# Patient Record
Sex: MALE | Race: BLACK OR AFRICAN AMERICAN | NOT HISPANIC OR LATINO | Employment: OTHER | URBAN - METROPOLITAN AREA
[De-identification: names, ages, dates, MRNs, and addresses within clinical notes are randomized per-mention and may not be internally consistent; named-entity substitution may affect disease eponyms.]

---

## 2017-07-25 ENCOUNTER — TELEPHONE (OUTPATIENT)
Dept: NEUROLOGY | Facility: CLINIC | Age: 72
End: 2017-07-25

## 2017-07-25 NOTE — TELEPHONE ENCOUNTER
Received referral from Dr. Jolley for patient to see Dr. Cardenas or Dr. Machado for epilepsy.     Tried to call patient. No answer. Referral in [facility] and sent to scanning.

## 2017-08-29 ENCOUNTER — TELEPHONE (OUTPATIENT)
Dept: NEUROLOGY | Facility: CLINIC | Age: 72
End: 2017-08-29

## 2017-08-29 NOTE — TELEPHONE ENCOUNTER
Spoke with pt to schedule an appointment with Dr. Machado. He advised that his wife needed to call back to schedule his appointment because he was not sure what day she could bring him

## 2017-09-25 ENCOUNTER — OFFICE VISIT (OUTPATIENT)
Dept: NEUROLOGY | Facility: CLINIC | Age: 72
End: 2017-09-25
Payer: MEDICARE

## 2017-09-25 ENCOUNTER — HOSPITAL ENCOUNTER (OUTPATIENT)
Dept: RADIOLOGY | Facility: HOSPITAL | Age: 72
Discharge: HOME | End: 2017-09-25
Attending: PSYCHIATRY & NEUROLOGY
Payer: MEDICARE

## 2017-09-25 VITALS
SYSTOLIC BLOOD PRESSURE: 147 MMHG | WEIGHT: 165 LBS | RESPIRATION RATE: 20 BRPM | DIASTOLIC BLOOD PRESSURE: 79 MMHG | HEART RATE: 54 BPM | BODY MASS INDEX: 24.44 KG/M2 | HEIGHT: 69 IN

## 2017-09-25 DIAGNOSIS — M85.9 DISORDER OF BONE DENSITY AND STRUCTURE, UNSPECIFIED: ICD-10-CM

## 2017-09-25 DIAGNOSIS — G40.009 PARTIAL IDIOPATHIC EPILEPSY WITH SEIZURES OF LOCALIZED ONSET, NOT INTRACTABLE, WITHOUT STATUS EPILEPTICUS: ICD-10-CM

## 2017-09-25 DIAGNOSIS — G40.009 PARTIAL IDIOPATHIC EPILEPSY WITH SEIZURES OF LOCALIZED ONSET, NOT INTRACTABLE, WITHOUT STATUS EPILEPTICUS: Primary | ICD-10-CM

## 2017-09-25 PROCEDURE — 77081 DXA BONE DENSITY APPENDICULR: CPT | Mod: TC,PO

## 2017-09-25 PROCEDURE — 3008F BODY MASS INDEX DOCD: CPT | Mod: S$GLB,,, | Performed by: PSYCHIATRY & NEUROLOGY

## 2017-09-25 PROCEDURE — 99204 OFFICE O/P NEW MOD 45 MIN: CPT | Mod: S$GLB,,, | Performed by: PSYCHIATRY & NEUROLOGY

## 2017-09-25 PROCEDURE — 77081 DXA BONE DENSITY APPENDICULR: CPT | Mod: 26,,, | Performed by: RADIOLOGY

## 2017-09-25 PROCEDURE — 99999 PR PBB SHADOW E&M-EST. PATIENT-LVL III: CPT | Mod: PBBFAC,,, | Performed by: PSYCHIATRY & NEUROLOGY

## 2017-09-25 PROCEDURE — 1159F MED LIST DOCD IN RCRD: CPT | Mod: S$GLB,,, | Performed by: PSYCHIATRY & NEUROLOGY

## 2017-09-25 PROCEDURE — 1126F AMNT PAIN NOTED NONE PRSNT: CPT | Mod: S$GLB,,, | Performed by: PSYCHIATRY & NEUROLOGY

## 2017-09-25 RX ORDER — CAPTOPRIL AND HYDROCHLOROTHIAZIDE 25; 15 MG/1; MG/1
TABLET ORAL
COMMUNITY
Start: 2017-07-03

## 2017-09-25 RX ORDER — LACOSAMIDE 200 MG/1
200 TABLET, FILM COATED ORAL 2 TIMES DAILY
COMMUNITY
Start: 2017-07-05 | End: 2018-03-14 | Stop reason: ALTCHOICE

## 2017-09-25 RX ORDER — CITALOPRAM 20 MG/1
20 TABLET, FILM COATED ORAL
COMMUNITY
Start: 2017-07-01 | End: 2022-10-18

## 2017-09-25 RX ORDER — PHENYTOIN SODIUM 100 MG/1
100 CAPSULE, EXTENDED RELEASE ORAL NIGHTLY
COMMUNITY
Start: 2017-08-25 | End: 2017-10-11 | Stop reason: SDUPTHER

## 2017-09-25 RX ORDER — PHENYTOIN 50 MG/1
50 TABLET, CHEWABLE ORAL NIGHTLY
COMMUNITY
Start: 2017-07-03 | End: 2017-10-11 | Stop reason: DRUGHIGH

## 2017-09-25 RX ORDER — DIAZEPAM 5 MG/1
5 TABLET ORAL DAILY
COMMUNITY
End: 2017-10-16 | Stop reason: SDUPTHER
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
## 2017-09-25 NOTE — LETTER
September 25, 2017      Nevin Jolley MD  [address]  [place] [address]           [facility]  [address] [facility] [address] [place] [address]  Phone: [phone]  Fax: [phone]          Patient: Kallie Ball   MR Number: [id number]   YOB: 1945   Date of Visit: 9/25/2017       Dear Dr. Nevin Jolley:    Thank you for referring Kallie Ball to me for evaluation. Attached you will find relevant portions of my assessment and plan of care.    If you have questions, please do not hesitate to call me. I look forward to following Kallie Ball along with you.    Sincerely,    Odilia Machado MD    Enclosure  CC:  No Recipients    If you would like to receive this communication electronically, please contact [email or web address] or [phone] to request more information on [facility] access.    For providers and/or their staff who would like to refer a patient to [facility], please contact us through our one-stop-shop provider referral line, [facility], at [phone].    If you feel you have received this communication in error or would no longer like to receive these types of communications, please e-mail [email or web address]

## 2017-09-25 NOTE — PATIENT INSTRUCTIONS
- Bone density scan  - Get records from past medications, lab tests, EEGs, MRI brain, and CT head.

## 2017-09-27 ENCOUNTER — TELEPHONE (OUTPATIENT)
Dept: NEUROLOGY | Facility: CLINIC | Age: 72
End: 2017-09-27

## 2017-09-27 NOTE — TELEPHONE ENCOUNTER
Spoke with patient and patient's wife. Was trying to schedule orders for MRI, Creatinine level and EEG. States she will call patient's PCP today to see if he can order those for them where they live and get results sent to our office. Verified that Dr. Machado said this was fine at their visit. Will wait for their call back.

## 2017-10-11 ENCOUNTER — TELEPHONE (OUTPATIENT)
Dept: NEUROLOGY | Facility: CLINIC | Age: 72
End: 2017-10-11

## 2017-10-11 DIAGNOSIS — G40.009 PARTIAL IDIOPATHIC EPILEPSY WITH SEIZURES OF LOCALIZED ONSET, NOT INTRACTABLE, WITHOUT STATUS EPILEPTICUS: Primary | ICD-10-CM

## 2017-10-11 RX ORDER — PHENYTOIN SODIUM 100 MG/1
200 CAPSULE, EXTENDED RELEASE ORAL NIGHTLY
Qty: 60 CAPSULE | Refills: 6 | Status: SHIPPED | OUTPATIENT
Start: 2017-10-11 | End: 2017-10-11 | Stop reason: SDUPTHER

## 2017-10-11 RX ORDER — PHENYTOIN SODIUM 100 MG/1
300 CAPSULE, EXTENDED RELEASE ORAL NIGHTLY
Qty: 90 CAPSULE | Refills: 6 | Status: SHIPPED | OUTPATIENT
Start: 2017-10-11 | End: 2022-08-02 | Stop reason: SDUPTHER

## 2017-10-11 NOTE — TELEPHONE ENCOUNTER
Spoke with patient's wife, Erica. Stated that patient is already taking 250 mg of Dilantin nightly; did you want him to get rid of the 50 mg tablet?

## 2017-10-11 NOTE — TELEPHONE ENCOUNTER
Spoke with patient's wife, Erica. Informed her of medication increase and that rx was at pharmacy for updated instructions. Verified where patient wanted orders sent to; faxed orders to Dr. Quiles.

## 2017-10-11 NOTE — PROGRESS NOTES
The patient had a breakthrough seizure. We will increase his phenytoin to 200 mg nightly. I have sent this to his pharmacy on file, [facility]. Vimpat should remain the same at 200 mg twice daily.     We will recheck a dilantin blood level in 1-2 weeks after dose change to obtain his new level baseline in the event more changes need to be made. This will be printed as an external lab order and can be faxed to his lab of choice.

## 2017-10-11 NOTE — TELEPHONE ENCOUNTER
----- Message from Darrell Funes sent at 10/11/2017 10:24 AM CDT -----  Contact: Wife/Erica Maldonado called in regarding the attached patient () and stated patient had a seizure yesterday 10/10 and wanted to talk to nurse about it.  Erica's call back number is [phone]
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
## 2017-10-11 NOTE — TELEPHONE ENCOUNTER
----- Message from Clementine Machado sent at 10/11/2017  1:28 PM CDT -----  Was advised to take 200 mgs of Dylantin ...That is the dosage that he is taking now/ call Erica  ..[phone] to advise

## 2017-10-11 NOTE — TELEPHONE ENCOUNTER
We will increase the Dilantin to 200 mg nightly. I will send through a prescription for this to his pharmacy on file. We should check a trough level 1-2 weeks after he makes that dose change. That could be done closer to home and I will write for a outside lab order that can be sent to his lab of choice.

## 2017-10-11 NOTE — TELEPHONE ENCOUNTER
Spoke with patient's wife, Erica. She stated that he was doing 250 mg a night; states they will increase it to 300 mg. Please send that to the pharmacy.

## 2017-10-11 NOTE — TELEPHONE ENCOUNTER
We had in our system that he was only taking 150 mg nightly of the dilantin. If she is sure he is taking 250 mg nightly, then I would have him increase to 300 mg nightly. Let me know and I can resend the prescription.

## 2017-10-16 RX ORDER — DIAZEPAM 5 MG/1
5 TABLET ORAL DAILY
Qty: 90 TABLET | Refills: 3 | Status: SHIPPED | OUTPATIENT
Start: 2017-10-16 | End: 2018-10-16

## 2017-10-16 NOTE — TELEPHONE ENCOUNTER
Spoke with Emelyn at [facility] pharmacy. Verified that Dilantin prescription was for 300 mg and not 200 mg anymore. She stated that they needed the prescription for the Valium; the system shows take 5 mg once a day, but it doesn't have a dispense number or refill number. Message routed to Dr. Machado.

## 2018-02-14 ENCOUNTER — TELEPHONE (OUTPATIENT)
Dept: NEUROLOGY | Facility: CLINIC | Age: 73
End: 2018-02-14

## 2018-02-14 NOTE — TELEPHONE ENCOUNTER
----- Message from Irlanda Ingram sent at 2/12/2018  2:44 PM CST -----  Contact: wife, Erica  Patient's wife says that the seizure medications are too expensive. She's requesting a cheaper substitute if possible. Please call back [phone]

## 2018-02-19 ENCOUNTER — TELEPHONE (OUTPATIENT)
Dept: NEUROLOGY | Facility: CLINIC | Age: 73
End: 2018-02-19

## 2018-02-19 RX ORDER — ZONISAMIDE 100 MG/1
300 CAPSULE ORAL DAILY
Qty: 90 CAPSULE | Refills: 3 | Status: SHIPPED | OUTPATIENT
Start: 2018-02-19 | End: 2019-02-19

## 2018-02-19 NOTE — TELEPHONE ENCOUNTER
Patient had two grand mal seizures and was taken to [facility]. He has not had his Vimpat in over 1 week, because he cannot afford the medicine. Patient instructed to call [employer] and let them know that he cannot afford the Vimpat.  Also given Lexus Nicole's number for assistance.  Please advise.

## 2018-02-19 NOTE — TELEPHONE ENCOUNTER
----- Message from Ciara Innershyanne sent at 2/19/2018 11:20 AM CST -----  Contact: Wife Erica  Patient has two bad seizures on Friday 2/16 and she had to rush him to [facility]. Set appt on 3/14 but needs to be seen now.  He is not on some of his anti seizure meds because his assistance ran out and needs help with the cost.   She is requesting a call back, to set an earlier appt call back at [phone] (home).

## 2018-02-19 NOTE — TELEPHONE ENCOUNTER
Spoke with patient's wife. Offered for her to come to the clinic to get savings card for Vimpat tomorrow; requesting a new prescription be called in all together.

## 2018-03-14 ENCOUNTER — OFFICE VISIT (OUTPATIENT)
Dept: NEUROLOGY | Facility: CLINIC | Age: 73
End: 2018-03-14
Payer: MEDICARE

## 2018-03-14 VITALS
HEART RATE: 57 BPM | BODY MASS INDEX: 24.09 KG/M2 | WEIGHT: 162.69 LBS | HEIGHT: 69 IN | RESPIRATION RATE: 20 BRPM | DIASTOLIC BLOOD PRESSURE: 72 MMHG | SYSTOLIC BLOOD PRESSURE: 169 MMHG

## 2018-03-14 DIAGNOSIS — R56.9 SEIZURES: Primary | ICD-10-CM

## 2018-03-14 DIAGNOSIS — F32.A DEPRESSION, UNSPECIFIED DEPRESSION TYPE: ICD-10-CM

## 2018-03-14 DIAGNOSIS — R45.4 IRRITABILITY: ICD-10-CM

## 2018-03-14 DIAGNOSIS — Z79.899 HIGH RISK MEDICATION USE: ICD-10-CM

## 2018-03-14 DIAGNOSIS — K59.00 CONSTIPATION, UNSPECIFIED CONSTIPATION TYPE: ICD-10-CM

## 2018-03-14 PROCEDURE — 99999 PR PBB SHADOW E&M-EST. PATIENT-LVL III: CPT | Mod: PBBFAC,,, | Performed by: PSYCHIATRY & NEUROLOGY

## 2018-03-14 PROCEDURE — 99215 OFFICE O/P EST HI 40 MIN: CPT | Mod: S$GLB,,, | Performed by: PSYCHIATRY & NEUROLOGY

## 2018-03-14 RX ORDER — PHENYTOIN 50 MG/1
50 TABLET, CHEWABLE ORAL
COMMUNITY
Start: 2018-03-13 | End: 2022-08-02 | Stop reason: SDUPTHER

## 2018-03-14 NOTE — PROGRESS NOTES
Date: 3/14/2018    Patient ID: Kallie Ball is a 72 y.o. male.    Chief Complaint: Seizures      History of Present Illness:  Mr. Ball is a 72 y.o. male who presents for follow up of seizures. The patient was accompanied by his wife who also contributed to the following history. I also reviewed his records from his ER visit in Feb 2018 and summarized below.     Since our last visit, the patient ran out of Vimpat and had two breakthrough seizures due to missing the Vimpat. These occurred on Friday February 16 at 11am and 8pm. He went to the ER and was given Vimpat 100 mg IV and then sent home. He was not able to get the Vimpat and could not afford to pick it up ($800 for the month). He has tried Keppra and trileptal in the past. He has no history of kidney stones and we added zonisamide since we can uptitrate quickly.     His last presumed seizure before these breakthrough seizures was in July 2017. His breakthrough seizures were on Feb 16, 2018. He is not driving anymore. He is very frustrated by the inability to drive and that loss of his independence. His wife notes that he is irritable. He also has constipation and has been taking mineral oil.     He is now taking the Dilantin 50 mg in the morning and 200 mg nightly. He is on the zonisamide 300 mg qHS (since last Sunday). He has had no further seizures since the breakthrough seizures. He is dizzy some mornings. No other side effects. He is on valium 5 mg daily.     Review of Systems:   Comprehensive review of systems is negative except as mentioned in the HPI    Allergies:  Review of patient's allergies indicates:   Allergen Reactions    Benadryl [diphenhydramine hcl] Other (See Comments)     Lowers seizure threshold.    Quinolones Other (See Comments)     Lowers seizure threshold.      Tramadol Other (See Comments)     Lowers seizure threshold.         Current Medications:  Current Outpatient Prescriptions   Medication Sig Dispense Refill     "captopril-hydrochlorothiazide (CAPOZIDE) 25-15 mg Tab       citalopram (CELEXA) 20 MG tablet Take 20 mg by mouth.      diazePAM (VALIUM) 5 MG tablet Take 1 tablet (5 mg total) by mouth once daily. 90 tablet 3    multivitamin with minerals tablet       phenytoin (DILANTIN) 100 MG ER capsule Take 3 capsules (300 mg total) by mouth every evening. (Patient taking differently: Take 200 mg by mouth every evening. ) 90 capsule 6    phenytoin (DILANTIN) 50 mg chewable tablet Take 50 mg by mouth.       zonisamide (ZONEGRAN) 100 MG Cap Take 3 capsules (300 mg total) by mouth once daily. 90 capsule 3     No current facility-administered medications for this visit.        Past Medical History:  Past Medical History:   Diagnosis Date    Epilepsy        Past Surgical History:  History reviewed. No pertinent surgical history.    Family History:  family history includes Stroke in his mother.    Social History:   reports that he has never smoked. He has never used smokeless tobacco. He reports that he does not drink alcohol or use drugs.    Physical Exam:  Vitals:    03/14/18 1524   BP: (!) 169/72   Pulse: (!) 57   Resp: 20   Weight: 73.8 kg (162 lb 11.2 oz)   Height: 5' 9" (1.753 m)   PainSc: 0-No pain     Body mass index is 24.03 kg/m².  General: Well developed, well nourished.  No acute distress.  Skin: no rashes  Mental status: Awake and alert  Speech language: No dysarthria or aphasia on conversation  Cranial nerves: Face symmetric  Motor: Moves all extremities well  Coordination: No ataxia. FNF normal. No tremor.   Gait: normal including tandem walking    Data:  I have personally reviewed the referring provider's notes, labs, & imaging made available to me today. I reviewed outside records from his ER evaluation in February 2018.     Labs:  CBC from 2/16/18: WBC 4.3, Hg 13.1, Plt 157  CMP normal electrolytes    Imaging:  No MRI obtained.   Bone density obtained in September 2017 was normal.     Assessment and Plan:  " Lizzy is a 72 y.o. male who presents for follow up of seizures. They have no obtained the EEG or MRI as ordered at previous appointment.     He has thankfully not had any further seizures. We discussed that he must be seizure-free for 6 months before driving. He should not drive until August 2018 if he stays seizure-free. I encouraged compliance with medications, lowering stress. He is irritable and has constipation. We discussed how depression commonly is associated with epilepsy and this can present as irritability. I encouraged him to discuss these with his PCP for treatment. They asked for a prescription for Linzess and increase in his valium. I discussed how increasing the valium is not a good option for that. The medication for constipation will have to be prescribed by his PCP.     We will obtain drug levels and panels for high risk drug monitoring. His DEXA will need to be repeated yearly. We will continue his current doses of zonisamide and phenytoin and make decisions about adjustment after levels obtained.     Seizures with recent breakthrough seizure activity  -     Phenytoin level, total and free; Future; Expected date: 03/15/2018  -     Zonisamide level; Future; Expected date: 03/14/2018  -     Comprehensive metabolic panel; Future; Expected date: 03/14/2018  -     CBC auto differential; Future; Expected date: 03/14/2018    Constipation, unspecified constipation type    Irritability    High risk medication use    Depression

## 2022-07-04 NOTE — PROGRESS NOTES
The patient ran out of Vimpat and had two breakthrough seizures due to missing the Vimpat. These occurred on Friday at 11am and 8pm. He went to the ER and was given Vimpat 100 mg IV and then sent home. They have not been able to get the Vimpat and cannot afford to pick it up ($800 for the month). He has tried Keppra and trileptal in the past. He has no history of kidney stones and we will try adding zonisamide since we can uptitrate quickly. I will call in a prescription for zonisamide 100 mg daily for 1 week, then can increase by 100 mg weekly for a max dose of 300 mg daily. I will see them back in follow up soon but encouraged her to call with questions or concerns or if he has more seizures in the meantime.   
negative...

## 2022-08-02 ENCOUNTER — OFFICE VISIT (OUTPATIENT)
Dept: FAMILY MEDICINE | Facility: CLINIC | Age: 77
End: 2022-08-02
Payer: MEDICARE

## 2022-08-02 VITALS
OXYGEN SATURATION: 98 % | BODY MASS INDEX: 23.05 KG/M2 | SYSTOLIC BLOOD PRESSURE: 130 MMHG | TEMPERATURE: 98 F | HEART RATE: 65 BPM | DIASTOLIC BLOOD PRESSURE: 70 MMHG | HEIGHT: 69 IN | WEIGHT: 155.63 LBS

## 2022-08-02 DIAGNOSIS — R31.9 URINARY TRACT INFECTION WITH HEMATURIA, SITE UNSPECIFIED: ICD-10-CM

## 2022-08-02 DIAGNOSIS — Z13.220 ENCOUNTER FOR LIPID SCREENING FOR CARDIOVASCULAR DISEASE: Primary | ICD-10-CM

## 2022-08-02 DIAGNOSIS — I10 HYPERTENSION, UNSPECIFIED TYPE: ICD-10-CM

## 2022-08-02 DIAGNOSIS — N39.0 URINARY TRACT INFECTION WITH HEMATURIA, SITE UNSPECIFIED: ICD-10-CM

## 2022-08-02 DIAGNOSIS — G40.009 PARTIAL IDIOPATHIC EPILEPSY WITH SEIZURES OF LOCALIZED ONSET, NOT INTRACTABLE, WITHOUT STATUS EPILEPTICUS: ICD-10-CM

## 2022-08-02 DIAGNOSIS — Z13.6 ENCOUNTER FOR LIPID SCREENING FOR CARDIOVASCULAR DISEASE: Primary | ICD-10-CM

## 2022-08-02 PROCEDURE — 1101F PT FALLS ASSESS-DOCD LE1/YR: CPT | Mod: CPTII,S$GLB,, | Performed by: NURSE PRACTITIONER

## 2022-08-02 PROCEDURE — 99999 PR PBB SHADOW E&M-NEW PATIENT-LVL V: CPT | Mod: PBBFAC,,, | Performed by: NURSE PRACTITIONER

## 2022-08-02 PROCEDURE — 1159F MED LIST DOCD IN RCRD: CPT | Mod: CPTII,S$GLB,, | Performed by: NURSE PRACTITIONER

## 2022-08-02 PROCEDURE — 99214 PR OFFICE/OUTPT VISIT, EST, LEVL IV, 30-39 MIN: ICD-10-PCS | Mod: S$GLB,,, | Performed by: NURSE PRACTITIONER

## 2022-08-02 PROCEDURE — 99214 OFFICE O/P EST MOD 30 MIN: CPT | Mod: S$GLB,,, | Performed by: NURSE PRACTITIONER

## 2022-08-02 PROCEDURE — 1160F PR REVIEW ALL MEDS BY PRESCRIBER/CLIN PHARMACIST DOCUMENTED: ICD-10-PCS | Mod: CPTII,S$GLB,, | Performed by: NURSE PRACTITIONER

## 2022-08-02 PROCEDURE — 3078F DIAST BP <80 MM HG: CPT | Mod: CPTII,S$GLB,, | Performed by: NURSE PRACTITIONER

## 2022-08-02 PROCEDURE — 3288F PR FALLS RISK ASSESSMENT DOCUMENTED: ICD-10-PCS | Mod: CPTII,S$GLB,, | Performed by: NURSE PRACTITIONER

## 2022-08-02 PROCEDURE — 3075F PR MOST RECENT SYSTOLIC BLOOD PRESS GE 130-139MM HG: ICD-10-PCS | Mod: CPTII,S$GLB,, | Performed by: NURSE PRACTITIONER

## 2022-08-02 PROCEDURE — 3288F FALL RISK ASSESSMENT DOCD: CPT | Mod: CPTII,S$GLB,, | Performed by: NURSE PRACTITIONER

## 2022-08-02 PROCEDURE — 1101F PR PT FALLS ASSESS DOC 0-1 FALLS W/OUT INJ PAST YR: ICD-10-PCS | Mod: CPTII,S$GLB,, | Performed by: NURSE PRACTITIONER

## 2022-08-02 PROCEDURE — 99999 PR PBB SHADOW E&M-NEW PATIENT-LVL V: ICD-10-PCS | Mod: PBBFAC,,, | Performed by: NURSE PRACTITIONER

## 2022-08-02 PROCEDURE — 3075F SYST BP GE 130 - 139MM HG: CPT | Mod: CPTII,S$GLB,, | Performed by: NURSE PRACTITIONER

## 2022-08-02 PROCEDURE — 1126F AMNT PAIN NOTED NONE PRSNT: CPT | Mod: CPTII,S$GLB,, | Performed by: NURSE PRACTITIONER

## 2022-08-02 PROCEDURE — 3078F PR MOST RECENT DIASTOLIC BLOOD PRESSURE < 80 MM HG: ICD-10-PCS | Mod: CPTII,S$GLB,, | Performed by: NURSE PRACTITIONER

## 2022-08-02 PROCEDURE — 1159F PR MEDICATION LIST DOCUMENTED IN MEDICAL RECORD: ICD-10-PCS | Mod: CPTII,S$GLB,, | Performed by: NURSE PRACTITIONER

## 2022-08-02 PROCEDURE — 1160F RVW MEDS BY RX/DR IN RCRD: CPT | Mod: CPTII,S$GLB,, | Performed by: NURSE PRACTITIONER

## 2022-08-02 PROCEDURE — 1126F PR PAIN SEVERITY QUANTIFIED, NO PAIN PRESENT: ICD-10-PCS | Mod: CPTII,S$GLB,, | Performed by: NURSE PRACTITIONER

## 2022-08-02 RX ORDER — AMOXICILLIN AND CLAVULANATE POTASSIUM 875; 125 MG/1; MG/1
1 TABLET, FILM COATED ORAL 2 TIMES DAILY
Qty: 20 TABLET | Refills: 0 | Status: SHIPPED | OUTPATIENT
Start: 2022-08-02 | End: 2022-08-12

## 2022-08-02 RX ORDER — BUPROPION HYDROCHLORIDE 150 MG/1
150 TABLET ORAL DAILY
Status: CANCELLED | OUTPATIENT
Start: 2022-08-02

## 2022-08-02 RX ORDER — LAMOTRIGINE 200 MG/1
200 TABLET ORAL DAILY
COMMUNITY

## 2022-08-02 RX ORDER — CAPTOPRIL 25 MG/1
25 TABLET ORAL 3 TIMES DAILY
COMMUNITY
End: 2022-08-02 | Stop reason: SDUPTHER

## 2022-08-02 RX ORDER — SIMVASTATIN 5 MG/1
5 TABLET, FILM COATED ORAL NIGHTLY
COMMUNITY

## 2022-08-02 RX ORDER — PHENYTOIN 50 MG/1
50 TABLET, CHEWABLE ORAL ONCE
Qty: 90 TABLET | Refills: 3 | Status: SHIPPED | OUTPATIENT
Start: 2022-08-02 | End: 2023-09-27

## 2022-08-02 RX ORDER — CAPTOPRIL 25 MG/1
25 TABLET ORAL 3 TIMES DAILY
Qty: 270 TABLET | Refills: 3 | Status: SHIPPED | OUTPATIENT
Start: 2022-08-02 | End: 2022-10-31

## 2022-08-02 RX ORDER — BUPROPION HYDROCHLORIDE 150 MG/1
150 TABLET ORAL DAILY
COMMUNITY

## 2022-08-02 RX ORDER — DIAZEPAM 5 MG/1
5 TABLET ORAL DAILY
Qty: 90 TABLET | Refills: 3 | Status: CANCELLED | OUTPATIENT
Start: 2022-08-02 | End: 2023-08-02

## 2022-08-02 RX ORDER — PHENYTOIN SODIUM 100 MG/1
300 CAPSULE, EXTENDED RELEASE ORAL NIGHTLY
Qty: 270 CAPSULE | Refills: 3 | Status: SHIPPED | OUTPATIENT
Start: 2022-08-02 | End: 2022-11-18 | Stop reason: SDUPTHER

## 2022-08-02 RX ORDER — OMEPRAZOLE MAGNESIUM 10 MG/1
GRANULE, DELAYED RELEASE ORAL
COMMUNITY

## 2022-08-02 NOTE — PATIENT INSTRUCTIONS
Ravindra Arreguin,     If you are due for any health screening(s) below please notify me so we can arrange them to be ordered and scheduled to maintain your health. Most healthy patients complete it. Don't lose out on improving your health.     Tests to Keep You Healthy             - continue all seizure medications and blood pressure medication      - check lipid and dilantin level      - referral to neurology ASAP     - appt with new PCP     - Do not drive until cleared by neurology

## 2022-08-02 NOTE — PROGRESS NOTES
Subjective:       Patient ID: Kallie Ball is a 77 y.o. male.    Chief Complaint: Seizures    77-year-old male presents to the clinic today with his daughter requesting refills on medications.  She does not feel like he still needs a Valium Wellbutrin.  I explained to her that Wellbutrin was contraindicated when he had a seizure disorder.  His last seizure was Friday night.  I stressed the importance that he should not be driving until cleared by Neurology.  He was seen in the emergency room in [facility] on 07/27/2022 for seizures and cystitis.  He was treated with Bactrim but the daughter states the Bactrim is causing seizures.  I will stop the Bactrim and start Augmentin.  I will try to get him into Neurology as soon as possible.  He denies any cardiac chest pain, heart palpitations, shortness breath, or swelling to lower extremities.  He denies any coughing, wheezing, or shortness of breath.  He denies any headaches, dizziness, or blurred vision.    Past Medical History:   Diagnosis Date    Epilepsy      History reviewed. No pertinent surgical history.   reports that he has never smoked. He has never used smokeless tobacco. He reports that he does not drink alcohol and does not use drugs.  Review of Systems   Cardiovascular: Negative for chest pain, palpitations and leg swelling.   Gastrointestinal: Negative for abdominal pain, diarrhea and vomiting.   Musculoskeletal: Negative for gait problem.   Neurological: Positive for seizures. Negative for dizziness, light-headedness and headaches.       Objective:      Physical Exam  Vitals and nursing note reviewed.   Constitutional:       General: He is not in acute distress.     Appearance: Normal appearance. He is not ill-appearing, toxic-appearing or diaphoretic.   Cardiovascular:      Rate and Rhythm: Normal rate and regular rhythm.      Heart sounds: Normal heart sounds. No murmur heard.    No friction rub.   Pulmonary:      Effort: Pulmonary effort is normal.       Breath sounds: Normal breath sounds. No wheezing or rhonchi.   Abdominal:      General: Bowel sounds are normal.      Palpations: Abdomen is soft.      Tenderness: There is no abdominal tenderness. There is no guarding or rebound.   Musculoskeletal:         General: No swelling. Normal range of motion.   Skin:     General: Skin is warm and dry.   Neurological:      Mental Status: He is alert and oriented to person, place, and time.   Psychiatric:         Mood and Affect: Mood normal.         Behavior: Behavior normal.         Thought Content: Thought content normal.         Judgment: Judgment normal.         Assessment:       1. Encounter for lipid screening for cardiovascular disease    2. Partial idiopathic epilepsy with seizures of localized onset, not intractable, without status epilepticus    3. Hypertension, unspecified type    4. Urinary tract infection with hematuria, site unspecified        Plan:         Encounter for lipid screening for cardiovascular disease  -     Lipid Panel; Future; Expected date: 08/02/2022    Partial idiopathic epilepsy with seizures of localized onset, not intractable, without status epilepticus  -     phenytoin (DILANTIN) 50 mg chewable tablet; Take 1 tablet (50 mg total) by mouth once. for 1 dose  Dispense: 90 tablet; Refill: 3  -     phenytoin (DILANTIN) 100 MG ER capsule; Take 3 capsules (300 mg total) by mouth every evening.  Dispense: 270 capsule; Refill: 3  -     PHENYTOIN LEVEL, TOTAL; Future; Expected date: 08/02/2022  -     Cancel: Ambulatory referral/consult to Neurology; Future; Expected date: 08/09/2022  -     Ambulatory referral/consult to Neurology; Future; Expected date: 08/09/2022    Hypertension, unspecified type  -     captopriL (CAPOTEN) 25 MG tablet; Take 1 tablet (25 mg total) by mouth 3 (three) times daily.  Dispense: 270 tablet; Refill: 3    Urinary tract infection with hematuria, site unspecified  -     amoxicillin-clavulanate 875-125mg (AUGMENTIN) 875-125  mg per tablet; Take 1 tablet by mouth 2 (two) times daily. for 10 days  Dispense: 20 tablet; Refill: 0  - Stop Bactrim

## 2022-08-03 ENCOUNTER — TELEPHONE (OUTPATIENT)
Dept: FAMILY MEDICINE | Facility: CLINIC | Age: 77
End: 2022-08-03
Payer: MEDICARE

## 2022-08-03 ENCOUNTER — LAB VISIT (OUTPATIENT)
Dept: LAB | Facility: HOSPITAL | Age: 77
End: 2022-08-03
Attending: NURSE PRACTITIONER
Payer: MEDICARE

## 2022-08-03 DIAGNOSIS — G40.009 PARTIAL IDIOPATHIC EPILEPSY WITH SEIZURES OF LOCALIZED ONSET, NOT INTRACTABLE, WITHOUT STATUS EPILEPTICUS: ICD-10-CM

## 2022-08-03 DIAGNOSIS — Z13.6 ENCOUNTER FOR LIPID SCREENING FOR CARDIOVASCULAR DISEASE: ICD-10-CM

## 2022-08-03 DIAGNOSIS — Z13.220 ENCOUNTER FOR LIPID SCREENING FOR CARDIOVASCULAR DISEASE: ICD-10-CM

## 2022-08-03 LAB
CHOLEST SERPL-MCNC: 180 MG/DL (ref 120–199)
CHOLEST/HDLC SERPL: 2.8 {RATIO} (ref 2–5)
HDLC SERPL-MCNC: 64 MG/DL (ref 40–75)
HDLC SERPL: 35.6 % (ref 20–50)
LDLC SERPL CALC-MCNC: 106 MG/DL (ref 63–159)
NONHDLC SERPL-MCNC: 116 MG/DL
PHENYTOIN SERPL-MCNC: 2.3 UG/ML (ref 10–20)
TRIGL SERPL-MCNC: 50 MG/DL (ref 30–150)

## 2022-08-03 PROCEDURE — 36415 COLL VENOUS BLD VENIPUNCTURE: CPT | Mod: PO | Performed by: NURSE PRACTITIONER

## 2022-08-03 PROCEDURE — 80061 LIPID PANEL: CPT | Performed by: NURSE PRACTITIONER

## 2022-08-03 PROCEDURE — 80185 ASSAY OF PHENYTOIN TOTAL: CPT | Performed by: NURSE PRACTITIONER

## 2022-08-03 NOTE — TELEPHONE ENCOUNTER
I spoke with the patient's daughter and she saw they saw neurology this am and wanted to check some more blood work before changing any medications. I told the patient daughter that is Dilantin level was low and his cholesterol was good. She verbalized understanding of above.

## 2022-08-04 DIAGNOSIS — R56.9 SEIZURE: Primary | ICD-10-CM

## 2022-08-25 DIAGNOSIS — R56.9 SEIZURE-LIKE ACTIVITY: Primary | ICD-10-CM

## 2022-09-06 ENCOUNTER — SSC ENCOUNTER (OUTPATIENT)
Dept: ADMINISTRATIVE | Facility: OTHER | Age: 77
End: 2022-09-06
Payer: MEDICARE

## 2022-09-06 ENCOUNTER — LAB VISIT (OUTPATIENT)
Dept: LAB | Facility: HOSPITAL | Age: 77
End: 2022-09-06
Attending: STUDENT IN AN ORGANIZED HEALTH CARE EDUCATION/TRAINING PROGRAM
Payer: MEDICARE

## 2022-09-06 DIAGNOSIS — R56.9 SEIZURE: Primary | ICD-10-CM

## 2022-09-06 PROCEDURE — 80203 DRUG SCREEN QUANT ZONISAMIDE: CPT | Performed by: STUDENT IN AN ORGANIZED HEALTH CARE EDUCATION/TRAINING PROGRAM

## 2022-09-06 PROCEDURE — 36415 COLL VENOUS BLD VENIPUNCTURE: CPT | Mod: PO | Performed by: STUDENT IN AN ORGANIZED HEALTH CARE EDUCATION/TRAINING PROGRAM

## 2022-09-06 PROCEDURE — 80175 DRUG SCREEN QUAN LAMOTRIGINE: CPT | Performed by: STUDENT IN AN ORGANIZED HEALTH CARE EDUCATION/TRAINING PROGRAM

## 2022-09-06 PROCEDURE — 80185 ASSAY OF PHENYTOIN TOTAL: CPT | Performed by: STUDENT IN AN ORGANIZED HEALTH CARE EDUCATION/TRAINING PROGRAM

## 2022-09-06 NOTE — PROGRESS NOTES
Referral for case management received from [facility]  via the [facility] email box/[email or web address] secure email portal. Patient is being enrolled to Outpatient Case Management. Please see the information below:    PCF mbr daughter Maria Teresa, verbal permission obtained from . Requesting CM for father. States he has seizures and can no longer drive. States needs assistance with transportation and food insecurity. Daughter lives two hours away and tries to visit once a week or every other week. States she sets up his medication and goes to store. States his wife left him about six months ago and daughter has been having to try to get him to apts.    Please contact [facility] with any questions (ext. [phone]).    Thank you,    Karli Pino, [facility]  Outpatient Case Mgmnt  [phone]

## 2022-09-07 ENCOUNTER — HOSPITAL ENCOUNTER (OUTPATIENT)
Dept: RADIOLOGY | Facility: HOSPITAL | Age: 77
Discharge: HOME | End: 2022-09-07
Attending: NURSE PRACTITIONER
Payer: MEDICARE

## 2022-09-07 DIAGNOSIS — R56.9 SEIZURE-LIKE ACTIVITY: ICD-10-CM

## 2022-09-07 LAB — PHENYTOIN SERPL-MCNC: 33.3 UG/ML (ref 10–20)

## 2022-09-07 PROCEDURE — 70551 MRI BRAIN STEM W/O DYE: CPT | Mod: TC

## 2022-09-08 ENCOUNTER — OUTPATIENT CASE MANAGEMENT (OUTPATIENT)
Dept: ADMINISTRATIVE | Facility: OTHER | Age: 77
End: 2022-09-08
Payer: MEDICARE

## 2022-09-08 NOTE — PROGRESS NOTES
Attempt #:  1  This LMSW attempted to reach patient/caregiver to provide resource and left a message requesting a return call.       SW received a return call from patient daughter regarding referral. Daughter provided SW with patient phone number to provide permission to speak with her regarding his care.     Patient provided SW permission to speak with daughter.        Daughter requesting assistance with transportation . SW provided daughter with avilable transportation resources. Daughter aware [facility] only provides transportation in [facility] . Patient provider in [place].  SW encouraged daughter to explore options thorugh Lyft and or UBER. SW provided daughter with the following resources via telephone and e-mail as requested.    Please complete attach involvement of care form and return to an [facility] facility  Please speak with [facility] billing department about financial assistance  Please find attach [facility] financial assistance application for assistance with [email or web address] ([email or web address]), or apply via telephone [phone] or toll free at [phone]  Please contact [facility] [employer] [facility] regarding Meals on Wheels,  Services (Light housekeeping), Transportation in the county [phone]  Explore transportation resources through Lyft and Uber  Please find attach [employer] Over the Counter Catalog

## 2022-09-09 LAB
LAMOTRIGINE SERPL-MCNC: <0.2 UG/ML (ref 2–15)
ZONISAMIDE SERPL-MCNC: 3.7 MCG/ML (ref 10–40)

## 2022-11-02 ENCOUNTER — TELEPHONE (OUTPATIENT)
Dept: FAMILY MEDICINE | Facility: CLINIC | Age: 77
End: 2022-11-02
Payer: MEDICARE

## 2022-11-02 NOTE — TELEPHONE ENCOUNTER
----- Message from Elise Ramirez sent at 11/2/2022  3:29 PM CDT -----  Contact: daughter  Type:  Patient Returning Call    Who Called: Maria Teresa, daughter  Who Left Message for Patient:  Angela  Does the patient know what this is regarding?:    Best Call Back Number:  [phone] (home)   Additional Information:

## 2022-11-02 NOTE — TELEPHONE ENCOUNTER
Left message for patient to contact the office regarding  refill request we received from [facility] pharmacy, the request is for Dilantin 300 mg, in the chart it is saying the medication is . We need to know if the patient is taking this medication.

## 2022-11-18 ENCOUNTER — PES CALL (OUTPATIENT)
Dept: ADMINISTRATIVE | Facility: CLINIC | Age: 77
End: 2022-11-18
Payer: MEDICARE

## 2022-11-18 ENCOUNTER — PATIENT MESSAGE (OUTPATIENT)
Dept: FAMILY MEDICINE | Facility: CLINIC | Age: 77
End: 2022-11-18
Payer: MEDICARE

## 2022-11-18 DIAGNOSIS — G40.009 PARTIAL IDIOPATHIC EPILEPSY WITH SEIZURES OF LOCALIZED ONSET, NOT INTRACTABLE, WITHOUT STATUS EPILEPTICUS: ICD-10-CM

## 2022-11-18 RX ORDER — PHENYTOIN SODIUM 100 MG/1
300 CAPSULE, EXTENDED RELEASE ORAL NIGHTLY
Qty: 270 CAPSULE | Refills: 0 | Status: SHIPPED | OUTPATIENT
Start: 2022-11-18 | End: 2023-02-16

## 2022-12-22 ENCOUNTER — PES CALL (OUTPATIENT)
Dept: ADMINISTRATIVE | Facility: CLINIC | Age: 77
End: 2022-12-22
Payer: MEDICARE

## 2023-01-11 RX ORDER — CITALOPRAM 20 MG/1
TABLET, FILM COATED ORAL
Qty: 45 TABLET | Refills: 0 | OUTPATIENT
Start: 2023-01-11

## 2023-01-24 ENCOUNTER — TELEPHONE (OUTPATIENT)
Dept: FAMILY MEDICINE | Facility: CLINIC | Age: 78
End: 2023-01-24
Payer: MEDICARE

## 2023-01-24 ENCOUNTER — PATIENT MESSAGE (OUTPATIENT)
Dept: FAMILY MEDICINE | Facility: CLINIC | Age: 78
End: 2023-01-24
Payer: MEDICARE

## 2023-07-19 DIAGNOSIS — R59.0 PARATRACHEAL LYMPHADENOPATHY: Primary | ICD-10-CM

## 2023-07-19 DIAGNOSIS — R42 DIZZINESS: Primary | ICD-10-CM

## 2023-07-19 DIAGNOSIS — N28.89 RIGHT RENAL MASS: Primary | ICD-10-CM

## 2023-07-20 ENCOUNTER — TELEPHONE (OUTPATIENT)
Dept: UROLOGY | Facility: CLINIC | Age: 78
End: 2023-07-20
Payer: MEDICARE

## 2023-07-20 NOTE — TELEPHONE ENCOUNTER
----- Message from Cinthia Arevalo sent at 7/11/2023  4:06 PM CDT -----  Type: Needs Medical Advice  Who Called:  Mari from [facility] Call Back Number: [phone]    Additional Information: Mari is calling because they sent over a referral for above pt and they would like to cancel. Pt wants to go somewhere else. Please call back to advise. Thanks!

## 2023-07-21 ENCOUNTER — OFFICE VISIT (OUTPATIENT)
Dept: UROLOGY | Facility: CLINIC | Age: 78
End: 2023-07-21
Payer: MEDICARE

## 2023-07-21 VITALS — WEIGHT: 159 LBS | BODY MASS INDEX: 23.55 KG/M2 | HEIGHT: 69 IN

## 2023-07-21 DIAGNOSIS — R33.9 URINARY RETENTION: Primary | ICD-10-CM

## 2023-07-21 PROCEDURE — 99999 PR PBB SHADOW E&M-EST. PATIENT-LVL II: ICD-10-PCS | Mod: PBBFAC,,, | Performed by: UROLOGY

## 2023-07-21 PROCEDURE — 1160F PR REVIEW ALL MEDS BY PRESCRIBER/CLIN PHARMACIST DOCUMENTED: ICD-10-PCS | Mod: CPTII,S$GLB,, | Performed by: UROLOGY

## 2023-07-21 PROCEDURE — 1160F RVW MEDS BY RX/DR IN RCRD: CPT | Mod: CPTII,S$GLB,, | Performed by: UROLOGY

## 2023-07-21 PROCEDURE — 99204 OFFICE O/P NEW MOD 45 MIN: CPT | Mod: S$GLB,,, | Performed by: UROLOGY

## 2023-07-21 PROCEDURE — 1126F PR PAIN SEVERITY QUANTIFIED, NO PAIN PRESENT: ICD-10-PCS | Mod: CPTII,S$GLB,, | Performed by: UROLOGY

## 2023-07-21 PROCEDURE — 3288F FALL RISK ASSESSMENT DOCD: CPT | Mod: CPTII,S$GLB,, | Performed by: UROLOGY

## 2023-07-21 PROCEDURE — 1159F PR MEDICATION LIST DOCUMENTED IN MEDICAL RECORD: ICD-10-PCS | Mod: CPTII,S$GLB,, | Performed by: UROLOGY

## 2023-07-21 PROCEDURE — 1159F MED LIST DOCD IN RCRD: CPT | Mod: CPTII,S$GLB,, | Performed by: UROLOGY

## 2023-07-21 PROCEDURE — 99204 PR OFFICE/OUTPT VISIT, NEW, LEVL IV, 45-59 MIN: ICD-10-PCS | Mod: S$GLB,,, | Performed by: UROLOGY

## 2023-07-21 PROCEDURE — 1101F PT FALLS ASSESS-DOCD LE1/YR: CPT | Mod: CPTII,S$GLB,, | Performed by: UROLOGY

## 2023-07-21 PROCEDURE — 1101F PR PT FALLS ASSESS DOC 0-1 FALLS W/OUT INJ PAST YR: ICD-10-PCS | Mod: CPTII,S$GLB,, | Performed by: UROLOGY

## 2023-07-21 PROCEDURE — 1126F AMNT PAIN NOTED NONE PRSNT: CPT | Mod: CPTII,S$GLB,, | Performed by: UROLOGY

## 2023-07-21 PROCEDURE — 99999 PR PBB SHADOW E&M-EST. PATIENT-LVL II: CPT | Mod: PBBFAC,,, | Performed by: UROLOGY

## 2023-07-21 PROCEDURE — 3288F PR FALLS RISK ASSESSMENT DOCUMENTED: ICD-10-PCS | Mod: CPTII,S$GLB,, | Performed by: UROLOGY

## 2023-07-21 RX ORDER — TAMSULOSIN HYDROCHLORIDE 0.4 MG/1
0.4 CAPSULE ORAL DAILY
Qty: 30 CAPSULE | Refills: 11 | Status: SHIPPED | OUTPATIENT
Start: 2023-07-21 | End: 2024-07-20
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
## 2023-07-24 ENCOUNTER — OFFICE VISIT (OUTPATIENT)
Dept: UROLOGY | Facility: CLINIC | Age: 78
End: 2023-07-24
Payer: MEDICARE

## 2023-07-24 ENCOUNTER — CLINICAL SUPPORT (OUTPATIENT)
Dept: UROLOGY | Facility: CLINIC | Age: 78
End: 2023-07-24
Payer: MEDICARE

## 2023-07-24 DIAGNOSIS — R33.9 URINARY RETENTION: Primary | ICD-10-CM

## 2023-07-24 LAB — POC RESIDUAL URINE VOLUME: 485 ML (ref 0–100)

## 2023-07-24 PROCEDURE — 51702 PR INSERTION OF TEMPORARY INDWELLING BLADDER CATHETER, SIMPLE: ICD-10-PCS | Mod: S$GLB,,, | Performed by: UROLOGY

## 2023-07-24 PROCEDURE — 51702 INSERT TEMP BLADDER CATH: CPT | Mod: S$GLB,,, | Performed by: UROLOGY

## 2023-07-24 PROCEDURE — 99999 PR PBB SHADOW E&M-EST. PATIENT-LVL I: CPT | Mod: PBBFAC,,, | Performed by: UROLOGY

## 2023-07-24 PROCEDURE — 51798 US URINE CAPACITY MEASURE: CPT | Mod: S$GLB,,, | Performed by: UROLOGY

## 2023-07-24 PROCEDURE — 99999 PR PBB SHADOW E&M-EST. PATIENT-LVL I: ICD-10-PCS | Mod: PBBFAC,,, | Performed by: UROLOGY

## 2023-07-24 PROCEDURE — 99499 UNLISTED E&M SERVICE: CPT | Mod: S$GLB,,, | Performed by: UROLOGY

## 2023-07-24 PROCEDURE — 99499 NO LOS: ICD-10-PCS | Mod: S$GLB,,, | Performed by: UROLOGY

## 2023-07-24 PROCEDURE — 51798 POCT BLADDER SCAN: ICD-10-PCS | Mod: S$GLB,,, | Performed by: UROLOGY

## 2023-07-24 NOTE — PROGRESS NOTES
"Patient here this afternoon for PVR.  2 patient identifiers verified.  Patient ambulated to restroom to void.   Patient states that he has been drinking plenty of water with little to no output.  PVR= > 485ml    Per Dr. Machado "insert catheter and have patient return in 3 weeks for NV for Kaur removal am/PVR pm."    Patient draped and prepped in sterile fashion.   16Fr coude catheter placed into the bladder with no difficulty.   Balloon filled with 10ml saline  Urine drained into urinal. Urinal and urine spilled onto floor.  300 ml still in urinal after spilled.  Catheter attached to leg bag.   Leg bag secured to right leg with stat-lock.   ?  Patient left the office in satisfactory condition.     "

## 2023-07-24 NOTE — PROGRESS NOTES
Patient here today to have his catheter removed.   ?  10 ml saline removed from catheter balloon.    16 FR Catheter removed .   Catheter bag contains 100 MLs of clear yellow urine   Informed patient to hydrate and return to the [place] u[facility] clinic at 2:30 for bladder scan.   Patient left the office in satisfactory condition.

## 2023-08-01 ENCOUNTER — TELEPHONE (OUTPATIENT)
Dept: UROLOGY | Facility: CLINIC | Age: 78
End: 2023-08-01
Payer: MEDICARE

## 2023-08-01 NOTE — TELEPHONE ENCOUNTER
Spoke with home health nurse Nery informed her per  catheter can be reinserted by home health. Home health nurse nery verbally voiced understanding.   None

## 2023-08-01 NOTE — TELEPHONE ENCOUNTER
----- Message from Vishal Purcell sent at 8/1/2023  9:18 AM CDT -----  Type: Needs Medical Advice  Who Called:  Nery from   Best Call Back Number: [phone]  Additional Information: Nery is calling the office to let the Dr know the pt's catheter fell out last night. She is needing to know what the Dr would like do about this issue. Please call back to advise Thanks!

## 2023-08-11 ENCOUNTER — CLINICAL SUPPORT (OUTPATIENT)
Dept: UROLOGY | Facility: CLINIC | Age: 78
End: 2023-08-11
Payer: MEDICARE

## 2023-08-11 ENCOUNTER — HOSPITAL ENCOUNTER (OUTPATIENT)
Dept: RADIOLOGY | Facility: HOSPITAL | Age: 78
Discharge: HOME | End: 2023-08-11
Attending: INTERNAL MEDICINE
Payer: MEDICARE

## 2023-08-11 DIAGNOSIS — N28.89 RIGHT RENAL MASS: ICD-10-CM

## 2023-08-11 DIAGNOSIS — R42 DIZZINESS: ICD-10-CM

## 2023-08-11 DIAGNOSIS — R33.9 URINARY RETENTION: Primary | ICD-10-CM

## 2023-08-11 DIAGNOSIS — I25.10 CORONARY ARTERY DISEASE: ICD-10-CM

## 2023-08-11 LAB — POC RESIDUAL URINE VOLUME: 809 ML (ref 0–100)

## 2023-08-11 PROCEDURE — 51798 POCT BLADDER SCAN: ICD-10-PCS | Mod: S$GLB,,, | Performed by: UROLOGY

## 2023-08-11 PROCEDURE — 51798 US URINE CAPACITY MEASURE: CPT | Mod: S$GLB,,, | Performed by: UROLOGY

## 2023-08-11 PROCEDURE — 93880 EXTRACRANIAL BILAT STUDY: CPT | Mod: TC

## 2023-08-11 PROCEDURE — 76770 US EXAM ABDO BACK WALL COMP: CPT | Mod: TC

## 2023-08-11 NOTE — PROGRESS NOTES
Patient here today to have his catheter removal.  2 patient identifiers verified.   ?  10ml saline removed from catheter balloon.   Catheter removed.   Catheter bag contains 20 ml urine.    Patient educated to drink plenty of water.  Patient instructed to RTC by 2:30pm for PVR.  Patient voiced understanding.  ?  Patient left the office in satisfactory condition.

## 2023-08-11 NOTE — PROGRESS NOTES
Patient arrived to clinic to do PVR, went to use the bathroom first, bladder scan done, resulted 809 ml. Informed results to Nurse and .

## 2023-09-12 ENCOUNTER — TELEPHONE (OUTPATIENT)
Dept: UROLOGY | Facility: CLINIC | Age: 78
End: 2023-09-12
Payer: MEDICARE

## 2023-09-12 NOTE — TELEPHONE ENCOUNTER
----- Message from Giancarlo Benavides sent at 9/12/2023  2:55 PM CDT -----  Contact: self  Type: Sooner Appointment Request        Caller is requesting a sooner appointment. Caller declined first available appointment listed below. Caller will not accept being placed on the waitlist and is requesting a message be sent to doctor.        Name of Caller: Patient   Best Call Back Number: [phone] or [phone]  Additional Information: Pt states he needs to get back for his appt for tomorrow at 2 pm. Plz call to get him scheduled. Catheter is still in and pt needs to see a doctor . Thanks

## 2023-09-13 ENCOUNTER — PATIENT MESSAGE (OUTPATIENT)
Dept: UROLOGY | Facility: CLINIC | Age: 78
End: 2023-09-13

## 2023-09-13 ENCOUNTER — OFFICE VISIT (OUTPATIENT)
Dept: UROLOGY | Facility: CLINIC | Age: 78
End: 2023-09-13
Payer: MEDICARE

## 2023-09-13 DIAGNOSIS — R33.9 URINARY RETENTION: Primary | ICD-10-CM

## 2023-09-13 DIAGNOSIS — Z97.8 FOLEY CATHETER IN PLACE PRIOR TO ARRIVAL: ICD-10-CM

## 2023-09-13 PROCEDURE — 3288F FALL RISK ASSESSMENT DOCD: CPT | Mod: CPTII,S$GLB,, | Performed by: NURSE PRACTITIONER

## 2023-09-13 PROCEDURE — 99214 PR OFFICE/OUTPT VISIT, EST, LEVL IV, 30-39 MIN: ICD-10-PCS | Mod: S$GLB,,, | Performed by: NURSE PRACTITIONER

## 2023-09-13 PROCEDURE — 1160F PR REVIEW ALL MEDS BY PRESCRIBER/CLIN PHARMACIST DOCUMENTED: ICD-10-PCS | Mod: CPTII,S$GLB,, | Performed by: NURSE PRACTITIONER

## 2023-09-13 PROCEDURE — 99999 PR PBB SHADOW E&M-EST. PATIENT-LVL III: CPT | Mod: PBBFAC,,, | Performed by: NURSE PRACTITIONER

## 2023-09-13 PROCEDURE — 1101F PT FALLS ASSESS-DOCD LE1/YR: CPT | Mod: CPTII,S$GLB,, | Performed by: NURSE PRACTITIONER

## 2023-09-13 PROCEDURE — 3288F PR FALLS RISK ASSESSMENT DOCUMENTED: ICD-10-PCS | Mod: CPTII,S$GLB,, | Performed by: NURSE PRACTITIONER

## 2023-09-13 PROCEDURE — 1101F PR PT FALLS ASSESS DOC 0-1 FALLS W/OUT INJ PAST YR: ICD-10-PCS | Mod: CPTII,S$GLB,, | Performed by: NURSE PRACTITIONER

## 2023-09-13 PROCEDURE — 1159F PR MEDICATION LIST DOCUMENTED IN MEDICAL RECORD: ICD-10-PCS | Mod: CPTII,S$GLB,, | Performed by: NURSE PRACTITIONER

## 2023-09-13 PROCEDURE — 1160F RVW MEDS BY RX/DR IN RCRD: CPT | Mod: CPTII,S$GLB,, | Performed by: NURSE PRACTITIONER

## 2023-09-13 PROCEDURE — 99999 PR PBB SHADOW E&M-EST. PATIENT-LVL III: ICD-10-PCS | Mod: PBBFAC,,, | Performed by: NURSE PRACTITIONER

## 2023-09-13 PROCEDURE — 99214 OFFICE O/P EST MOD 30 MIN: CPT | Mod: S$GLB,,, | Performed by: NURSE PRACTITIONER

## 2023-09-13 PROCEDURE — 1159F MED LIST DOCD IN RCRD: CPT | Mod: CPTII,S$GLB,, | Performed by: NURSE PRACTITIONER
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
## 2023-09-22 ENCOUNTER — OFFICE VISIT (OUTPATIENT)
Dept: UROLOGY | Facility: CLINIC | Age: 78
End: 2023-09-22
Payer: MEDICARE

## 2023-09-22 VITALS
WEIGHT: 165.81 LBS | HEART RATE: 66 BPM | HEIGHT: 69 IN | BODY MASS INDEX: 24.56 KG/M2 | SYSTOLIC BLOOD PRESSURE: 172 MMHG | DIASTOLIC BLOOD PRESSURE: 88 MMHG

## 2023-09-22 DIAGNOSIS — R33.9 URINARY RETENTION: Primary | ICD-10-CM

## 2023-09-22 DIAGNOSIS — Z12.5 SCREENING FOR PROSTATE CANCER: ICD-10-CM

## 2023-09-22 PROCEDURE — 99999 PR PBB SHADOW E&M-EST. PATIENT-LVL III: ICD-10-PCS | Mod: PBBFAC,,, | Performed by: UROLOGY

## 2023-09-22 PROCEDURE — 87086 URINE CULTURE/COLONY COUNT: CPT | Performed by: UROLOGY

## 2023-09-22 PROCEDURE — 1126F PR PAIN SEVERITY QUANTIFIED, NO PAIN PRESENT: ICD-10-PCS | Mod: CPTII,S$GLB,, | Performed by: UROLOGY

## 2023-09-22 PROCEDURE — 1126F AMNT PAIN NOTED NONE PRSNT: CPT | Mod: CPTII,S$GLB,, | Performed by: UROLOGY

## 2023-09-22 PROCEDURE — 99214 OFFICE O/P EST MOD 30 MIN: CPT | Mod: 25,S$GLB,, | Performed by: UROLOGY

## 2023-09-22 PROCEDURE — 3288F PR FALLS RISK ASSESSMENT DOCUMENTED: ICD-10-PCS | Mod: CPTII,S$GLB,, | Performed by: UROLOGY

## 2023-09-22 PROCEDURE — 99214 PR OFFICE/OUTPT VISIT, EST, LEVL IV, 30-39 MIN: ICD-10-PCS | Mod: 25,S$GLB,, | Performed by: UROLOGY

## 2023-09-22 PROCEDURE — 1159F PR MEDICATION LIST DOCUMENTED IN MEDICAL RECORD: ICD-10-PCS | Mod: CPTII,S$GLB,, | Performed by: UROLOGY

## 2023-09-22 PROCEDURE — 87186 SC STD MICRODIL/AGAR DIL: CPT | Performed by: UROLOGY

## 2023-09-22 PROCEDURE — 3079F PR MOST RECENT DIASTOLIC BLOOD PRESSURE 80-89 MM HG: ICD-10-PCS | Mod: CPTII,S$GLB,, | Performed by: UROLOGY

## 2023-09-22 PROCEDURE — 87088 URINE BACTERIA CULTURE: CPT | Performed by: UROLOGY

## 2023-09-22 PROCEDURE — 3288F FALL RISK ASSESSMENT DOCD: CPT | Mod: CPTII,S$GLB,, | Performed by: UROLOGY

## 2023-09-22 PROCEDURE — 87077 CULTURE AEROBIC IDENTIFY: CPT | Performed by: UROLOGY

## 2023-09-22 PROCEDURE — 51702 PR INSERTION OF TEMPORARY INDWELLING BLADDER CATHETER, SIMPLE: ICD-10-PCS | Mod: S$GLB,,, | Performed by: UROLOGY

## 2023-09-22 PROCEDURE — 99999 PR PBB SHADOW E&M-EST. PATIENT-LVL III: CPT | Mod: PBBFAC,,, | Performed by: UROLOGY

## 2023-09-22 PROCEDURE — 3079F DIAST BP 80-89 MM HG: CPT | Mod: CPTII,S$GLB,, | Performed by: UROLOGY

## 2023-09-22 PROCEDURE — 3077F PR MOST RECENT SYSTOLIC BLOOD PRESSURE >= 140 MM HG: ICD-10-PCS | Mod: CPTII,S$GLB,, | Performed by: UROLOGY

## 2023-09-22 PROCEDURE — 3077F SYST BP >= 140 MM HG: CPT | Mod: CPTII,S$GLB,, | Performed by: UROLOGY

## 2023-09-22 PROCEDURE — 1101F PT FALLS ASSESS-DOCD LE1/YR: CPT | Mod: CPTII,S$GLB,, | Performed by: UROLOGY

## 2023-09-22 PROCEDURE — 1159F MED LIST DOCD IN RCRD: CPT | Mod: CPTII,S$GLB,, | Performed by: UROLOGY

## 2023-09-22 PROCEDURE — 51702 INSERT TEMP BLADDER CATH: CPT | Mod: S$GLB,,, | Performed by: UROLOGY

## 2023-09-22 PROCEDURE — 1101F PR PT FALLS ASSESS DOC 0-1 FALLS W/OUT INJ PAST YR: ICD-10-PCS | Mod: CPTII,S$GLB,, | Performed by: UROLOGY
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
## 2023-09-22 NOTE — PROGRESS NOTES
Procedure Order to Urology [[id number]]    Electronically signed by: Renny Machado Jr., MD on 09/22/23 0926 Status: Active   Ordering user: Renny Machado Jr., MD 09/22/23 0926 Authorized by: Renny Machado Jr., MD   Ordering mode: Standard   Frequency:  09/22/23 -     Diagnoses   Urinary retention [R33.9]   Questionnaire    Question Answer   Procedure Cystoscopy   TRUS/Trans Rectal Ultrasound   Pre-op Diagnosis Urinary retention   Facility Name: [place]   Order comments: Cystoscopy at the [facility] on 9/27/23.   [facility], Please order Urine POCT without micro . Local sedation (no urine Dr Machado or Dr ivory) /trus

## 2023-09-25 ENCOUNTER — PATIENT MESSAGE (OUTPATIENT)
Dept: UROLOGY | Facility: CLINIC | Age: 78
End: 2023-09-25
Payer: MEDICARE

## 2023-09-25 LAB — BACTERIA UR CULT: ABNORMAL

## 2023-09-25 RX ORDER — CEPHALEXIN 500 MG/1
500 CAPSULE ORAL EVERY 8 HOURS
Qty: 9 CAPSULE | Refills: 0 | Status: SHIPPED | OUTPATIENT
Start: 2023-09-25 | End: 2023-09-28

## 2023-09-27 ENCOUNTER — HOSPITAL ENCOUNTER (OUTPATIENT)
Facility: HOSPITAL | Age: 78
Discharge: HOME | End: 2023-09-27
Attending: UROLOGY | Admitting: UROLOGY
Payer: MEDICARE

## 2023-09-27 VITALS
TEMPERATURE: 100 F | HEIGHT: 69 IN | BODY MASS INDEX: 24.44 KG/M2 | OXYGEN SATURATION: 97 % | WEIGHT: 165 LBS | SYSTOLIC BLOOD PRESSURE: 137 MMHG | DIASTOLIC BLOOD PRESSURE: 60 MMHG | RESPIRATION RATE: 18 BRPM | HEART RATE: 97 BPM

## 2023-09-27 DIAGNOSIS — R33.9 URINARY RETENTION: Primary | ICD-10-CM

## 2023-09-27 PROCEDURE — 52000 PR CYSTOURETHROSCOPY: ICD-10-PCS | Mod: ,,, | Performed by: UROLOGY

## 2023-09-27 PROCEDURE — 52000 CYSTOURETHROSCOPY: CPT | Performed by: UROLOGY

## 2023-09-27 PROCEDURE — 76872 US TRANSRECTAL: CPT | Performed by: UROLOGY

## 2023-09-27 PROCEDURE — 76872 PR US TRANSRECTAL: ICD-10-PCS | Mod: 26,,, | Performed by: UROLOGY

## 2023-09-27 PROCEDURE — 76872 US TRANSRECTAL: CPT | Mod: 26,,, | Performed by: UROLOGY

## 2023-09-27 PROCEDURE — 25000003 PHARM REV CODE 250: Performed by: UROLOGY

## 2023-09-27 PROCEDURE — 52000 CYSTOURETHROSCOPY: CPT | Mod: ,,, | Performed by: UROLOGY

## 2023-09-27 RX ORDER — LIDOCAINE HYDROCHLORIDE 20 MG/ML
JELLY TOPICAL
Status: DISCONTINUED | OUTPATIENT
Start: 2023-09-27 | End: 2023-09-27 | Stop reason: HOSPADM

## 2023-09-27 NOTE — PLAN OF CARE
Discharge instructions given to pt, veralized understanding.  Kaur catheter changed.  Nurse visit 10/25.  Ambulating out to friend in lobby in no distress.
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
## 2023-11-02 ENCOUNTER — TELEPHONE (OUTPATIENT)
Dept: UROLOGY | Facility: CLINIC | Age: 78
End: 2023-11-02
Payer: MEDICARE

## 2023-11-02 NOTE — TELEPHONE ENCOUNTER
----- Message from Cinthia Arevalo sent at 11/2/2023  1:21 PM CDT -----  Type:  Sooner Appointment Request    Caller is requesting a sooner appointment.  Caller declined first available appointment listed below.  Caller will not accept being placed on the waitlist and is requesting a message be sent to doctor.    Name of Caller:  Pt's Monserrat Verdin    When is the first available appointment?  Has appt 11/22 at 9 am    Symptoms:  catheter change    Would the patient rather a call back or a response via [facility]?  Call back    Best Call Back Number:  [phone]    Additional Information:   Pastor Verdin is asking that this appt get push up sooner since the catheter is bothering the pt.  Please call back to advise. Thanks!

## 2023-11-20 ENCOUNTER — TELEPHONE (OUTPATIENT)
Dept: UROLOGY | Facility: CLINIC | Age: 78
End: 2023-11-20
Payer: MEDICARE

## 2023-11-20 NOTE — TELEPHONE ENCOUNTER
----- Message from Tin Reich sent at 11/20/2023  4:09 PM CST -----  Contact: Nery/[facility]  Type:  Sooner Appointment Request    Caller is requesting a sooner appointment.  Caller declined first available appointment listed below.  Caller will not accept being placed on the waitlist and is requesting a message be sent to doctor.    Name of Caller:  Nery/[facility]  When is the first available appointment?  N/a  Symptoms:  Catheter  Would the patient rather a call back or a response via [facility]? Call  Best Call Back Number:  [phone]  Additional Information:   Transportation issue for pt. Wanted to r/s 11/24 nurse visit

## 2023-11-20 NOTE — TELEPHONE ENCOUNTER
----- Message from Fuentes Rivera sent at 11/20/2023  3:10 PM CST -----  Type:  Patient Returning Call    Who Called:  Patient  Who Left Message for Patient:  Eloy  Does the patient know what this is regarding?:  Nurse Visit needs to be rescheduled  Best Call Back Number:  [phone]  Additional Information:

## 2023-11-20 NOTE — TELEPHONE ENCOUNTER
Tried calling patient and other numbers listed in regards to cath change on wednesday. Tried calling them to inform them cath change will be moved to Friday. No answer. Left message on vm

## 2023-12-01 ENCOUNTER — CLINICAL SUPPORT (OUTPATIENT)
Dept: UROLOGY | Facility: CLINIC | Age: 78
End: 2023-12-01
Payer: MEDICARE

## 2023-12-01 DIAGNOSIS — R33.9 URINARY RETENTION: Primary | ICD-10-CM

## 2023-12-01 PROCEDURE — 51702 PR INSERTION OF TEMPORARY INDWELLING BLADDER CATHETER, SIMPLE: ICD-10-PCS | Mod: S$GLB,,, | Performed by: UROLOGY

## 2023-12-01 PROCEDURE — 51702 INSERT TEMP BLADDER CATH: CPT | Mod: S$GLB,,, | Performed by: UROLOGY

## 2023-12-01 PROCEDURE — 99499 UNLISTED E&M SERVICE: CPT | Mod: S$GLB,,, | Performed by: UROLOGY

## 2023-12-01 PROCEDURE — 99499 NO LOS: ICD-10-PCS | Mod: S$GLB,,, | Performed by: UROLOGY

## 2023-12-01 NOTE — PROGRESS NOTES
Patient here today for catheter change.   2 patient identifiers verified  10 mL sterile water removed from catheter balloon.   50 ml of sterile water placed in bladder  Catheter removed.   Catheter bag contains 75ml of yellow urine  ?  Patient draped and prepped in sterile fashion.?  16 FR Coude catheter placed into the bladder with no difficulty.   Balloon filled with 10 ml sterile water  Catheter attached to leg bag.   Leg bag secured to right with stat-Lock.  ?  Patient left the office in satisfactory condition

## 2023-12-28 ENCOUNTER — OFFICE VISIT (OUTPATIENT)
Dept: UROLOGY | Facility: CLINIC | Age: 78
End: 2023-12-28
Payer: MEDICARE

## 2023-12-28 DIAGNOSIS — Z97.8 FOLEY CATHETER IN PLACE PRIOR TO ARRIVAL: ICD-10-CM

## 2023-12-28 DIAGNOSIS — R33.9 URINARY RETENTION: Primary | ICD-10-CM

## 2023-12-28 PROCEDURE — 1101F PT FALLS ASSESS-DOCD LE1/YR: CPT | Mod: CPTII,S$GLB,, | Performed by: UROLOGY

## 2023-12-28 PROCEDURE — 3288F FALL RISK ASSESSMENT DOCD: CPT | Mod: CPTII,S$GLB,, | Performed by: UROLOGY

## 2023-12-28 PROCEDURE — 3288F PR FALLS RISK ASSESSMENT DOCUMENTED: ICD-10-PCS | Mod: CPTII,S$GLB,, | Performed by: UROLOGY

## 2023-12-28 PROCEDURE — 99215 PR OFFICE/OUTPT VISIT, EST, LEVL V, 40-54 MIN: ICD-10-PCS | Mod: 25,S$GLB,, | Performed by: UROLOGY

## 2023-12-28 PROCEDURE — 51702 INSERT TEMP BLADDER CATH: CPT | Mod: S$GLB,,, | Performed by: UROLOGY

## 2023-12-28 PROCEDURE — 1159F PR MEDICATION LIST DOCUMENTED IN MEDICAL RECORD: ICD-10-PCS | Mod: CPTII,S$GLB,, | Performed by: UROLOGY

## 2023-12-28 PROCEDURE — 1159F MED LIST DOCD IN RCRD: CPT | Mod: CPTII,S$GLB,, | Performed by: UROLOGY

## 2023-12-28 PROCEDURE — 99999 PR PBB SHADOW E&M-EST. PATIENT-LVL III: CPT | Mod: PBBFAC,,, | Performed by: UROLOGY

## 2023-12-28 PROCEDURE — 1160F RVW MEDS BY RX/DR IN RCRD: CPT | Mod: CPTII,S$GLB,, | Performed by: UROLOGY

## 2023-12-28 PROCEDURE — 99215 OFFICE O/P EST HI 40 MIN: CPT | Mod: 25,S$GLB,, | Performed by: UROLOGY

## 2023-12-28 PROCEDURE — 51702 PR INSERTION OF TEMPORARY INDWELLING BLADDER CATHETER, SIMPLE: ICD-10-PCS | Mod: S$GLB,,, | Performed by: UROLOGY

## 2023-12-28 PROCEDURE — 1160F PR REVIEW ALL MEDS BY PRESCRIBER/CLIN PHARMACIST DOCUMENTED: ICD-10-PCS | Mod: CPTII,S$GLB,, | Performed by: UROLOGY

## 2023-12-28 PROCEDURE — 1101F PR PT FALLS ASSESS DOC 0-1 FALLS W/OUT INJ PAST YR: ICD-10-PCS | Mod: CPTII,S$GLB,, | Performed by: UROLOGY

## 2023-12-28 PROCEDURE — 99999 PR PBB SHADOW E&M-EST. PATIENT-LVL III: ICD-10-PCS | Mod: PBBFAC,,, | Performed by: UROLOGY

## 2023-12-28 NOTE — PROGRESS NOTES
Patient here today for catheter change.   2 patient identifiers verified  10 mL sterile water removed from catheter balloon.   50 ml of sterile water placed in bladder  Catheter removed.   Catheter bag contains 200ml of yellow/ cloudy  urine  ?  Patient draped and prepped in sterile fashion.?  16 FR Coude catheter placed into the bladder with no difficulty.   Flushed with 100 mLs of sterile water  Balloon filled with 10 ml sterile water  Catheter attached to leg bag.   Leg bag secured to right with stat-Lock.  ?  Patient left the office in satisfactory condition
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
## 2024-01-31 ENCOUNTER — CLINICAL SUPPORT (OUTPATIENT)
Dept: UROLOGY | Facility: CLINIC | Age: 79
End: 2024-01-31
Payer: MEDICARE

## 2024-01-31 DIAGNOSIS — R33.9 URINARY RETENTION: Primary | ICD-10-CM

## 2024-01-31 PROCEDURE — 51702 INSERT TEMP BLADDER CATH: CPT | Mod: S$GLB,,, | Performed by: UROLOGY

## 2024-01-31 PROCEDURE — 99499 UNLISTED E&M SERVICE: CPT | Mod: S$GLB,,, | Performed by: UROLOGY

## 2024-01-31 NOTE — PROGRESS NOTES
Patient here today for catheter change.   2 patient identifiers verified  10 mL sterile water removed from catheter balloon.   60 ml of sterile water placed in bladder  Catheter removed.   Catheter bag contains 10ml of yellow urine  ?  Patient draped and prepped in sterile fashion.?  16 FR Coude catheter placed into the bladder with no difficulty.   Balloon filled with 10 ml sterile water  Catheter attached to leg bag.   Leg bag secured to right with stat-Lock.  ?  Patient left the office in satisfactory condition

## 2024-02-07 ENCOUNTER — TELEPHONE (OUTPATIENT)
Dept: UROLOGY | Facility: CLINIC | Age: 79
End: 2024-02-07
Payer: MEDICARE

## 2024-02-07 NOTE — TELEPHONE ENCOUNTER
----- Message from Jackie Walters sent at 2/7/2024  9:18 AM CST -----  Contact: Daniel Majano  Type:  Needs Medical Advice    Who Called:   Milagro Marroquin, Friend    Would the patient rather a call back or a response via [employer] [facility]?   Call back  Best Call Back Number:   [phone]    Additional Information:  States he would like to speak with someone to find out when he will be getting the bag off of his leg - please call to advise - thank you

## 2024-02-07 NOTE — TELEPHONE ENCOUNTER
Spoke with pt wife. Informed her that the only way the catheter can stay out is if patient agrees to do CIC. Patient wife verbalized understanding

## 2024-02-29 ENCOUNTER — CLINICAL SUPPORT (OUTPATIENT)
Dept: UROLOGY | Facility: CLINIC | Age: 79
End: 2024-02-29
Payer: MEDICARE

## 2024-02-29 DIAGNOSIS — R33.9 URINARY RETENTION: Primary | ICD-10-CM

## 2024-02-29 PROCEDURE — 99499 UNLISTED E&M SERVICE: CPT | Mod: S$GLB,,, | Performed by: UROLOGY

## 2024-02-29 PROCEDURE — 51702 INSERT TEMP BLADDER CATH: CPT | Mod: S$GLB,,, | Performed by: UROLOGY

## 2024-02-29 NOTE — PROGRESS NOTES
Pt arrived to clinic to have catheter change. Deflated 10mL saline balloon, removed 16 coude catheter without difficulty. Pt draped and prepped inserted 16 fr coude catheter without difficulty. Stat lock and leg bag attached. Patient tolerated well.

## 2024-03-28 ENCOUNTER — CLINICAL SUPPORT (OUTPATIENT)
Dept: UROLOGY | Facility: CLINIC | Age: 79
End: 2024-03-28
Payer: MEDICARE

## 2024-03-28 DIAGNOSIS — R33.9 URINARY RETENTION: Primary | ICD-10-CM

## 2024-03-28 PROCEDURE — 99499 UNLISTED E&M SERVICE: CPT | Mod: S$GLB,,, | Performed by: UROLOGY

## 2024-03-28 PROCEDURE — 51702 INSERT TEMP BLADDER CATH: CPT | Mod: S$GLB,,, | Performed by: UROLOGY

## 2024-03-28 NOTE — PROGRESS NOTES
Pt arrived to clinic to have catheter change. Deflated 10mL saline balloon, removed 16 fr coude catheter without difficulty. Pt draped and prepped inserted 16 fr coude catheter without difficulty. Stat lock and leg bag attached. Patient tolerated well.

## 2024-04-08 ENCOUNTER — HOSPITAL ENCOUNTER (INPATIENT)
Facility: HOSPITAL | Age: 79
LOS: 1 days | Discharge: HOME | DRG: 068 | End: 2024-04-10
Attending: EMERGENCY MEDICINE | Admitting: FAMILY MEDICINE
Payer: MEDICARE

## 2024-04-08 DIAGNOSIS — R07.9 CHEST PAIN: ICD-10-CM

## 2024-04-08 DIAGNOSIS — I65.21 STENOSIS OF RIGHT CAROTID ARTERY: ICD-10-CM

## 2024-04-08 DIAGNOSIS — R42 DIZZINESS: Primary | ICD-10-CM

## 2024-04-08 PROBLEM — G40.909 SEIZURE DISORDER: Status: ACTIVE | Noted: 2024-04-08

## 2024-04-08 PROBLEM — E78.5 HLD (HYPERLIPIDEMIA): Status: ACTIVE | Noted: 2024-04-08

## 2024-04-08 PROBLEM — I10 HTN (HYPERTENSION): Status: ACTIVE | Noted: 2024-04-08

## 2024-04-08 LAB
ALBUMIN SERPL BCP-MCNC: 4.5 G/DL (ref 3.5–5.2)
ALP SERPL-CCNC: 133 U/L (ref 55–135)
ALT SERPL W/O P-5'-P-CCNC: 33 U/L (ref 10–44)
ANION GAP SERPL CALC-SCNC: 10 MMOL/L (ref 8–16)
AST SERPL-CCNC: 25 U/L (ref 10–40)
BACTERIA #/AREA URNS HPF: ABNORMAL /HPF
BASOPHILS # BLD AUTO: 0.02 K/UL (ref 0–0.2)
BASOPHILS NFR BLD: 0.5 % (ref 0–1.9)
BILIRUB SERPL-MCNC: 0.4 MG/DL (ref 0.1–1)
BILIRUB UR QL STRIP: NEGATIVE
BNP SERPL-MCNC: 43 PG/ML (ref 0–99)
BUN SERPL-MCNC: 12 MG/DL (ref 8–23)
CALCIUM SERPL-MCNC: 9.2 MG/DL (ref 8.7–10.5)
CHLORIDE SERPL-SCNC: 105 MMOL/L (ref 95–110)
CLARITY UR: CLEAR
CO2 SERPL-SCNC: 23 MMOL/L (ref 23–29)
COLOR UR: COLORLESS
CREAT SERPL-MCNC: 1.1 MG/DL (ref 0.5–1.4)
DIFFERENTIAL METHOD BLD: ABNORMAL
EOSINOPHIL # BLD AUTO: 0.1 K/UL (ref 0–0.5)
EOSINOPHIL NFR BLD: 1.9 % (ref 0–8)
ERYTHROCYTE [DISTWIDTH] IN BLOOD BY AUTOMATED COUNT: 13.1 % (ref 11.5–14.5)
EST. GFR  (NO RACE VARIABLE): >60 ML/MIN/1.73 M^2
GLUCOSE SERPL-MCNC: 79 MG/DL (ref 70–110)
GLUCOSE SERPL-MCNC: 84 MG/DL (ref 70–110)
GLUCOSE UR QL STRIP: NEGATIVE
HCT VFR BLD AUTO: 42.6 % (ref 40–54)
HGB BLD-MCNC: 13.9 G/DL (ref 14–18)
HGB UR QL STRIP: NEGATIVE
IMM GRANULOCYTES # BLD AUTO: 0.01 K/UL (ref 0–0.04)
IMM GRANULOCYTES NFR BLD AUTO: 0.2 % (ref 0–0.5)
KETONES UR QL STRIP: NEGATIVE
LEUKOCYTE ESTERASE UR QL STRIP: ABNORMAL
LYMPHOCYTES # BLD AUTO: 1.5 K/UL (ref 1–4.8)
LYMPHOCYTES NFR BLD: 34.9 % (ref 18–48)
MAGNESIUM SERPL-MCNC: 2 MG/DL (ref 1.6–2.6)
MCH RBC QN AUTO: 30.3 PG (ref 27–31)
MCHC RBC AUTO-ENTMCNC: 32.6 G/DL (ref 32–36)
MCV RBC AUTO: 93 FL (ref 82–98)
MICROSCOPIC COMMENT: ABNORMAL
MONOCYTES # BLD AUTO: 0.5 K/UL (ref 0.3–1)
MONOCYTES NFR BLD: 11.8 % (ref 4–15)
NEUTROPHILS # BLD AUTO: 2.2 K/UL (ref 1.8–7.7)
NEUTROPHILS NFR BLD: 50.7 % (ref 38–73)
NITRITE UR QL STRIP: NEGATIVE
NRBC BLD-RTO: 0 /100 WBC
PH UR STRIP: 7 [PH] (ref 5–8)
PLATELET # BLD AUTO: 178 K/UL (ref 150–450)
PMV BLD AUTO: 9.5 FL (ref 9.2–12.9)
POTASSIUM SERPL-SCNC: 3.9 MMOL/L (ref 3.5–5.1)
PROT SERPL-MCNC: 8.2 G/DL (ref 6–8.4)
PROT UR QL STRIP: NEGATIVE
RBC # BLD AUTO: 4.58 M/UL (ref 4.6–6.2)
RBC #/AREA URNS HPF: 0 /HPF (ref 0–4)
SODIUM SERPL-SCNC: 138 MMOL/L (ref 136–145)
SP GR UR STRIP: 1.03 (ref 1–1.03)
TROPONIN I SERPL HS-MCNC: 6.8 PG/ML (ref 0–14.9)
URN SPEC COLLECT METH UR: ABNORMAL
UROBILINOGEN UR STRIP-ACNC: NEGATIVE EU/DL
WBC # BLD AUTO: 4.24 K/UL (ref 3.9–12.7)
WBC #/AREA URNS HPF: 10 /HPF (ref 0–5)

## 2024-04-08 PROCEDURE — 25500020 PHARM REV CODE 255: Performed by: EMERGENCY MEDICINE

## 2024-04-08 PROCEDURE — 96372 THER/PROPH/DIAG INJ SC/IM: CPT | Performed by: NURSE PRACTITIONER

## 2024-04-08 PROCEDURE — 93005 ELECTROCARDIOGRAM TRACING: CPT | Performed by: GENERAL PRACTICE

## 2024-04-08 PROCEDURE — G0378 HOSPITAL OBSERVATION PER HR: HCPCS

## 2024-04-08 PROCEDURE — 63600175 PHARM REV CODE 636 W HCPCS: Performed by: EMERGENCY MEDICINE

## 2024-04-08 PROCEDURE — 83735 ASSAY OF MAGNESIUM: CPT | Performed by: EMERGENCY MEDICINE

## 2024-04-08 PROCEDURE — 25000003 PHARM REV CODE 250: Performed by: INTERNAL MEDICINE

## 2024-04-08 PROCEDURE — 96376 TX/PRO/DX INJ SAME DRUG ADON: CPT

## 2024-04-08 PROCEDURE — 83880 ASSAY OF NATRIURETIC PEPTIDE: CPT | Performed by: EMERGENCY MEDICINE

## 2024-04-08 PROCEDURE — 81001 URINALYSIS AUTO W/SCOPE: CPT | Performed by: NURSE PRACTITIONER

## 2024-04-08 PROCEDURE — 63600175 PHARM REV CODE 636 W HCPCS: Performed by: NURSE PRACTITIONER

## 2024-04-08 PROCEDURE — 99285 EMERGENCY DEPT VISIT HI MDM: CPT | Mod: 25

## 2024-04-08 PROCEDURE — 96361 HYDRATE IV INFUSION ADD-ON: CPT

## 2024-04-08 PROCEDURE — 96374 THER/PROPH/DIAG INJ IV PUSH: CPT

## 2024-04-08 PROCEDURE — 84484 ASSAY OF TROPONIN QUANT: CPT | Performed by: EMERGENCY MEDICINE

## 2024-04-08 PROCEDURE — 85025 COMPLETE CBC W/AUTO DIFF WBC: CPT | Performed by: NURSE PRACTITIONER

## 2024-04-08 PROCEDURE — 82962 GLUCOSE BLOOD TEST: CPT

## 2024-04-08 PROCEDURE — 80053 COMPREHEN METABOLIC PANEL: CPT | Performed by: NURSE PRACTITIONER

## 2024-04-08 PROCEDURE — 93010 ELECTROCARDIOGRAM REPORT: CPT | Mod: ,,, | Performed by: GENERAL PRACTICE

## 2024-04-08 PROCEDURE — 25000003 PHARM REV CODE 250: Performed by: NURSE PRACTITIONER

## 2024-04-08 RX ORDER — ACETAMINOPHEN 325 MG/1
650 TABLET ORAL EVERY 8 HOURS PRN
Status: DISCONTINUED | OUTPATIENT
Start: 2024-04-08 | End: 2024-04-10 | Stop reason: HOSPADM

## 2024-04-08 RX ORDER — LOSARTAN POTASSIUM 50 MG/1
100 TABLET ORAL DAILY
Status: DISCONTINUED | OUTPATIENT
Start: 2024-04-09 | End: 2024-04-08

## 2024-04-08 RX ORDER — TAMSULOSIN HYDROCHLORIDE 0.4 MG/1
0.4 CAPSULE ORAL DAILY
Status: DISCONTINUED | OUTPATIENT
Start: 2024-04-09 | End: 2024-04-08

## 2024-04-08 RX ORDER — MIDODRINE HYDROCHLORIDE 10 MG/1
10 TABLET ORAL ONCE
Status: COMPLETED | OUTPATIENT
Start: 2024-04-09 | End: 2024-04-08

## 2024-04-08 RX ORDER — SODIUM,POTASSIUM PHOSPHATES 280-250MG
2 POWDER IN PACKET (EA) ORAL
Status: DISCONTINUED | OUTPATIENT
Start: 2024-04-08 | End: 2024-04-10 | Stop reason: HOSPADM

## 2024-04-08 RX ORDER — ENOXAPARIN SODIUM 100 MG/ML
40 INJECTION SUBCUTANEOUS EVERY 24 HOURS
Status: DISCONTINUED | OUTPATIENT
Start: 2024-04-08 | End: 2024-04-10 | Stop reason: HOSPADM

## 2024-04-08 RX ORDER — ALUMINUM HYDROXIDE, MAGNESIUM HYDROXIDE, AND SIMETHICONE 1200; 120; 1200 MG/30ML; MG/30ML; MG/30ML
30 SUSPENSION ORAL 4 TIMES DAILY PRN
Status: DISCONTINUED | OUTPATIENT
Start: 2024-04-08 | End: 2024-04-10 | Stop reason: HOSPADM

## 2024-04-08 RX ORDER — NALOXONE HCL 0.4 MG/ML
0.02 VIAL (ML) INJECTION
Status: DISCONTINUED | OUTPATIENT
Start: 2024-04-08 | End: 2024-04-10 | Stop reason: HOSPADM

## 2024-04-08 RX ORDER — BUPROPION HYDROCHLORIDE 150 MG/1
150 TABLET ORAL DAILY
Status: DISCONTINUED | OUTPATIENT
Start: 2024-04-09 | End: 2024-04-10 | Stop reason: HOSPADM

## 2024-04-08 RX ORDER — IBUPROFEN 200 MG
24 TABLET ORAL
Status: DISCONTINUED | OUTPATIENT
Start: 2024-04-08 | End: 2024-04-10 | Stop reason: HOSPADM

## 2024-04-08 RX ORDER — GLUCAGON 1 MG
1 KIT INJECTION
Status: DISCONTINUED | OUTPATIENT
Start: 2024-04-08 | End: 2024-04-10 | Stop reason: HOSPADM

## 2024-04-08 RX ORDER — METOPROLOL TARTRATE 1 MG/ML
5 INJECTION, SOLUTION INTRAVENOUS EVERY 5 MIN PRN
Status: DISCONTINUED | OUTPATIENT
Start: 2024-04-08 | End: 2024-04-08

## 2024-04-08 RX ORDER — IBUPROFEN 200 MG
16 TABLET ORAL
Status: DISCONTINUED | OUTPATIENT
Start: 2024-04-08 | End: 2024-04-10 | Stop reason: HOSPADM

## 2024-04-08 RX ORDER — HYDRALAZINE HYDROCHLORIDE 20 MG/ML
10 INJECTION INTRAMUSCULAR; INTRAVENOUS EVERY 4 HOURS PRN
Status: DISCONTINUED | OUTPATIENT
Start: 2024-04-08 | End: 2024-04-08

## 2024-04-08 RX ORDER — LANOLIN ALCOHOL/MO/W.PET/CERES
800 CREAM (GRAM) TOPICAL
Status: DISCONTINUED | OUTPATIENT
Start: 2024-04-08 | End: 2024-04-10 | Stop reason: HOSPADM

## 2024-04-08 RX ORDER — MECLIZINE HCL 12.5 MG 12.5 MG/1
25 TABLET ORAL 3 TIMES DAILY PRN
Status: DISCONTINUED | OUTPATIENT
Start: 2024-04-08 | End: 2024-04-08

## 2024-04-08 RX ORDER — MECLIZINE HCL 12.5 MG 12.5 MG/1
25 TABLET ORAL EVERY 6 HOURS
Status: DISCONTINUED | OUTPATIENT
Start: 2024-04-09 | End: 2024-04-08

## 2024-04-08 RX ORDER — ACETAMINOPHEN 325 MG/1
650 TABLET ORAL EVERY 4 HOURS PRN
Status: DISCONTINUED | OUTPATIENT
Start: 2024-04-08 | End: 2024-04-10 | Stop reason: HOSPADM

## 2024-04-08 RX ORDER — ASPIRIN 81 MG/1
81 TABLET ORAL DAILY
Status: DISCONTINUED | OUTPATIENT
Start: 2024-04-09 | End: 2024-04-10 | Stop reason: HOSPADM

## 2024-04-08 RX ORDER — CITALOPRAM 10 MG/1
10 TABLET ORAL DAILY
Status: DISCONTINUED | OUTPATIENT
Start: 2024-04-09 | End: 2024-04-10 | Stop reason: HOSPADM

## 2024-04-08 RX ORDER — ONDANSETRON HYDROCHLORIDE 2 MG/ML
4 INJECTION, SOLUTION INTRAVENOUS EVERY 6 HOURS PRN
Status: DISCONTINUED | OUTPATIENT
Start: 2024-04-08 | End: 2024-04-10 | Stop reason: HOSPADM

## 2024-04-08 RX ORDER — TALC
6 POWDER (GRAM) TOPICAL NIGHTLY PRN
Status: DISCONTINUED | OUTPATIENT
Start: 2024-04-08 | End: 2024-04-10 | Stop reason: HOSPADM

## 2024-04-08 RX ORDER — ZONISAMIDE 100 MG/1
200 CAPSULE ORAL NIGHTLY
Status: DISCONTINUED | OUTPATIENT
Start: 2024-04-08 | End: 2024-04-10 | Stop reason: HOSPADM

## 2024-04-08 RX ORDER — HYDROCODONE BITARTRATE AND ACETAMINOPHEN 5; 325 MG/1; MG/1
1 TABLET ORAL EVERY 6 HOURS PRN
Status: DISCONTINUED | OUTPATIENT
Start: 2024-04-08 | End: 2024-04-10 | Stop reason: HOSPADM

## 2024-04-08 RX ORDER — ATORVASTATIN CALCIUM 40 MG/1
40 TABLET, FILM COATED ORAL NIGHTLY
Status: DISCONTINUED | OUTPATIENT
Start: 2024-04-08 | End: 2024-04-10 | Stop reason: HOSPADM

## 2024-04-08 RX ORDER — PHENYTOIN SODIUM 100 MG/1
200 CAPSULE, EXTENDED RELEASE ORAL 2 TIMES DAILY
Status: DISCONTINUED | OUTPATIENT
Start: 2024-04-08 | End: 2024-04-10 | Stop reason: HOSPADM

## 2024-04-08 RX ORDER — HYDRALAZINE HYDROCHLORIDE 20 MG/ML
10 INJECTION INTRAMUSCULAR; INTRAVENOUS
Status: COMPLETED | OUTPATIENT
Start: 2024-04-08 | End: 2024-04-08

## 2024-04-08 RX ORDER — MECLIZINE HCL 12.5 MG 12.5 MG/1
25 TABLET ORAL EVERY 6 HOURS
Status: DISCONTINUED | OUTPATIENT
Start: 2024-04-08 | End: 2024-04-10 | Stop reason: HOSPADM

## 2024-04-08 RX ADMIN — IOHEXOL 100 ML: 350 INJECTION, SOLUTION INTRAVENOUS at 03:04

## 2024-04-08 RX ADMIN — SODIUM CHLORIDE 1000 ML: 9 INJECTION, SOLUTION INTRAVENOUS at 11:04

## 2024-04-08 RX ADMIN — ZONISAMIDE 200 MG: 100 CAPSULE ORAL at 09:04

## 2024-04-08 RX ADMIN — PHENYTOIN SODIUM 200 MG: 100 CAPSULE ORAL at 09:04

## 2024-04-08 RX ADMIN — HYDRALAZINE HYDROCHLORIDE 10 MG: 20 INJECTION INTRAMUSCULAR; INTRAVENOUS at 05:04

## 2024-04-08 RX ADMIN — ENOXAPARIN SODIUM 40 MG: 40 INJECTION SUBCUTANEOUS at 06:04

## 2024-04-08 RX ADMIN — LACOSAMIDE 150 MG: 100 TABLET, FILM COATED ORAL at 09:04

## 2024-04-08 RX ADMIN — MECLIZINE HYDROCHLORIDE 25 MG: 12.5 TABLET ORAL at 09:04

## 2024-04-08 RX ADMIN — HYDRALAZINE HYDROCHLORIDE 10 MG: 20 INJECTION, SOLUTION INTRAMUSCULAR; INTRAVENOUS at 09:04

## 2024-04-08 RX ADMIN — ATORVASTATIN CALCIUM 40 MG: 40 TABLET, FILM COATED ORAL at 09:04

## 2024-04-08 RX ADMIN — MIDODRINE HYDROCHLORIDE 10 MG: 10 TABLET ORAL at 11:04

## 2024-04-08 NOTE — FIRST PROVIDER EVALUATION
" Emergency Department TeleTriage Encounter Note      CHIEF COMPLAINT    Chief Complaint   Patient presents with    Dizziness     Dizziness sent from cardiologist. Pt states "I get dizzy on and off and I haven't fell yet but I'm gonna."         VITAL SIGNS   Initial Vitals [04/08/24 1203]   BP Pulse Resp Temp SpO2   (!) 165/84 66 18 98 °F (36.7 °C) 97 %      MAP       --            ALLERGIES    Review of patient's allergies indicates:   Allergen Reactions    Bactrim [sulfamethoxazole-trimethoprim]     Benadryl [diphenhydramine hcl] Other (See Comments)     Lowers seizure threshold.    Quinolones Other (See Comments)     Lowers seizure threshold.      Tadalafil     Tramadol Other (See Comments)     Lowers seizure threshold.         PROVIDER TRIAGE NOTE  Verbal consent for the teletriage evaluation was given by the patient at the start of the evaluation.  All efforts will be made to maintain patient's privacy during the evaluation.      This is a teletriage evaluation of a 79 y.o. male per caregiver presenting to the ED with c/o intermittent dizziness that started last week.  Sent in by Cardiology for further evaluation. Limited physical exam via telehealth: The patient is awake, alert, answering questions appropriately and is not in respiratory distress.  As the Teletriage provider, I performed an initial assessment and ordered appropriate labs and imaging studies, if any, to facilitate the patient's care once placed in the ED. Once a room is available, care and a full evaluation will be completed by an alternate ED provider.  Any additional orders and the final disposition will be determined by that provider.  All imaging and labs will not be followed-up by the Teletriage Team, including myself.          ORDERS  Labs Reviewed   CBC W/ AUTO DIFFERENTIAL   COMPREHENSIVE METABOLIC PANEL   URINALYSIS, REFLEX TO URINE CULTURE   POCT GLUCOSE MONITORING CONTINUOUS       ED Orders (720h ago, onward)      Start Ordered     " Status Ordering Provider    04/08/24 1214 04/08/24 1213  Urinalysis, Reflex to Urine Culture Urine, Clean Catch  STAT         Ordered OLEG PORRAS    04/08/24 1213 04/08/24 1213  Saline lock IV  Once         Ordered OLEG PORRAS    04/08/24 1213 04/08/24 1213  Orthostatic blood pressure  Once         Ordered OLEG PORRAS    04/08/24 1213 04/08/24 1213  Pulse Oximetry Continuous  Continuous         Ordered OLEG PORRAS    04/08/24 1213 04/08/24 1213  Cardiac Monitoring - Adult  Continuous        Comments: Notify Physician If:    Ordered OLEG PORRAS    04/08/24 1213 04/08/24 1213  EKG 12-lead  Once         Ordered OLEG PORRAS    04/08/24 1213 04/08/24 1213  CBC auto differential  STAT         Ordered OLEG PORRAS    04/08/24 1213 04/08/24 1213  Comprehensive metabolic panel  STAT         Ordered OLEG PORRAS    04/08/24 1213 04/08/24 1213  POCT glucose  Once         Ordered OLEG PORRAS              Virtual Visit Note: The provider triage portion of this emergency department evaluation and documentation was performed via [employer], a HIPAA-compliant telemedicine application, in concert with a tele-presenter in the room. A face to face patient evaluation with one of my colleagues will occur once the patient is placed in an emergency department room.      DISCLAIMER: This note was prepared with [employer] voice recognition transcription software. Garbled syntax, mangled pronouns, and other bizarre constructions may be attributed to that software system.

## 2024-04-08 NOTE — ED PROVIDER NOTES
"Encounter Date: 4/8/2024       History     Chief Complaint   Patient presents with    Dizziness     Dizziness sent from cardiologist. Pt states "I get dizzy on and off and I haven't fell yet but I'm gonna."       This is a 79-year-old male with history of bipolar disorder and epilepsy comes in complaining of intermittent dizziness.  Patient reports that he has been having episodes of dizziness where he feels like he is going to fall.  He feels intermittently off balance.  Patient denies any symptoms at present.  He reports that symptoms have been ongoing for the past few days.  He denies any headache.  No focal weakness or numbness.  No chest pain or shortness of breath.  He denies any exacerbating or alleviating factors otherwise.      Review of patient's allergies indicates:   Allergen Reactions    Bactrim [sulfamethoxazole-trimethoprim]     Benadryl [diphenhydramine hcl] Other (See Comments)     Lowers seizure threshold.    Quinolones Other (See Comments)     Lowers seizure threshold.      Tadalafil     Tramadol Other (See Comments)     Lowers seizure threshold.       Past Medical History:   Diagnosis Date    Epilepsy      Past Surgical History:   Procedure Laterality Date    CYSTOSCOPY N/A 9/27/2023    Procedure: CYSTOSCOPY;  Surgeon: Renny Machado Jr., MD;  Location: [facility] OR;  Service: Urology;  Laterality: N/A;    TRANSRECTAL ULTRASOUND EXAMINATION N/A 9/27/2023    Procedure: ULTRASOUND, RECTAL APPROACH;  Surgeon: Renny Machado Jr., MD;  Location: [facility] OR;  Service: Urology;  Laterality: N/A;     Family History   Problem Relation Age of Onset    Stroke Mother      Social History     Tobacco Use    Smoking status: Never    Smokeless tobacco: Never   Substance Use Topics    Alcohol use: No    Drug use: No     Review of Systems   Constitutional:  Negative for chills and fever.   HENT:  Negative for congestion, sore throat and trouble swallowing.    Respiratory:  Negative for cough and shortness of " breath.    Cardiovascular:  Negative for chest pain and palpitations.   Gastrointestinal:  Negative for abdominal pain, diarrhea, nausea and vomiting.   Genitourinary:  Negative for dysuria and flank pain.   Musculoskeletal:  Negative for back pain and neck pain.   Neurological:  Positive for dizziness. Negative for weakness, numbness and headaches.   Psychiatric/Behavioral:  Negative for agitation and confusion.    All other systems reviewed and are negative.      Physical Exam     Initial Vitals [04/08/24 1203]   BP Pulse Resp Temp SpO2   (!) 165/84 66 18 98 °F (36.7 °C) 97 %      MAP       --         Physical Exam    Nursing note and vitals reviewed.  Constitutional: Vital signs are normal. He appears well-developed and well-nourished.  Non-toxic appearance. No distress.   HENT:   Head: Normocephalic and atraumatic.   Mouth/Throat: Oropharynx is clear and moist.   Eyes: EOM are normal. Pupils are equal, round, and reactive to light.   Neck: Neck supple.   Normal range of motion.  Cardiovascular:  Normal rate, regular rhythm and intact distal pulses.           Pulmonary/Chest: Breath sounds normal. He has no wheezes.   Abdominal: Abdomen is soft. Bowel sounds are normal. There is no abdominal tenderness.   Musculoskeletal:         General: No tenderness or edema. Normal range of motion.      Cervical back: Normal range of motion and neck supple. No rigidity. No muscular tenderness.     Lymphadenopathy:     He has no cervical adenopathy.     He has no axillary adenopathy.   Neurological: He is alert and oriented to person, place, and time. He has normal strength. No cranial nerve deficit or sensory deficit. Gait normal.   Skin: Skin is warm, dry and intact.   Psychiatric: He has a normal mood and affect. His behavior is normal.         ED Course   Procedures  Labs Reviewed   CBC W/ AUTO DIFFERENTIAL - Abnormal; Notable for the following components:       Result Value    RBC 4.58 (*)     Hemoglobin 13.9 (*)     All  other components within normal limits   URINALYSIS, REFLEX TO URINE CULTURE - Abnormal; Notable for the following components:    Color, UA Colorless (*)     Leukocytes, UA 3+ (*)     All other components within normal limits    Narrative:     Specimen Source->Urine   URINALYSIS MICROSCOPIC - Abnormal; Notable for the following components:    WBC, UA 10 (*)     All other components within normal limits    Narrative:     Specimen Source->Urine   COMPREHENSIVE METABOLIC PANEL   B-TYPE NATRIURETIC PEPTIDE   MAGNESIUM   TROPONIN I HIGH SENSITIVITY   POCT GLUCOSE   POCT GLUCOSE MONITORING CONTINUOUS     EKG Readings: (Independently Interpreted)   EKG was independently interpreted by me.  Time: 12:48 p.m.  Rate 64 beats per minute  Normal sinus rhythm  Nonspecific T-wave abnormality  No priors     ECG Results              EKG 12-lead (In process)        Collection Time Result Time QRS Duration OHS QTC Calculation    04/08/24 12:48:09 04/08/24 13:10:08 88 540                     In process by Interface, Lab In [facility] (04/08/24 13:10:15)                   Narrative:    Test Reason : R42,    Vent. Rate : 064 BPM     Atrial Rate : 064 BPM     P-R Int : 204 ms          QRS Dur : 088 ms      QT Int : 524 ms       P-R-T Axes : 070 079 031 degrees     QTc Int : 540 ms    Normal sinus rhythm  Septal infarct ,age undetermined  Prolonged QT  Abnormal ECG  No previous ECGs available    Referred By: ERIC MOSELEY           Confirmed By:                                   Imaging Results              CTA Head and Neck (xpd) (Final result)  Result time 04/08/24 16:24:26      Final result by Leandro Zavala MD (04/08/24 16:24:26)                   Impression:      1. Focal 80-90% stenosis of the right ICA cavernous segment.  2. Otherwise no significant abnormality of the intracranial arterial vasculature.  3. Atheromatous plaque of the carotid bulbs and ICA origins, with no significant carotid or vertebral artery stenosis.  4. A 37 mm  right thyroid lobe nodule.  Further characterization with non emergent outpatient thyroid ultrasound is recommended.      Electronically signed by: Leandro Thompsonjason  Date:    04/08/2024  Time:    16:24               Narrative:    EXAMINATION:  CTA HEAD AND NECK (XPD)    CLINICAL HISTORY:  Dizziness, persistent/recurrent, cardiac or vascular cause suspected;    TECHNIQUE:  Thin axial imaging through the head and neck was performed with 100 mL Omnipaque 350 IV contrast, with sagittal and coronal reformatted images and MIP reconstructions performed, and images stored in the patient's permanent electronic medical record.    COMPARISON:  Multiple prior exams.    FINDINGS:  CTA NECK: Mild calcified plaque involves the aortic arch, which is widely patent.  The visualized subclavian arteries are widely patent, with the visualized common carotid arteries widely patent.  Portion of the left common carotid artery is not visualized due to beam hardening artifact from adjacent contrast in the jugular vein.    Calcified and soft plaque involves the carotid bulbs and ICA origins, was no significant stenosis.  The bilateral internal carotid arteries are widely patent through the level of the skull base.  The external carotid arteries and branches are patent.    The vertebral arteries arise normally from the subclavian arteries, and are widely patent, with the right vertebral artery dominant.  There is no arterial dissection or aneurysm.    A 37 mm heterogeneously enhancing nodule arises from the inferior aspect of the right lobe of the thyroid gland, extending into the superior mediastinum.  The visualized upper lungs are clear.    CTA HEAD: The intracranial segments of the distal vertebral arteries and basilar artery are patent.  The distal cervical and petrous segments of the internal carotid arteries are patent.  There is focal 80-90% stenosis of the right ICA cavernous segment, with luminal irregularity is both carotid  siphons.    The bilateral anterior cerebral arteries are diminutive but patent, with the bilateral middle cerebral arteries and posterior cerebral arteries patent and tapering appropriately.  There is no intracranial aneurysm or vascular malformation.  The visualized dural venous sinuses and cortical veins enhance normally.                                       CT Head Without Contrast (Final result)  Result time 04/08/24 16:09:32      Final result by Leandro Zavala MD (04/08/24 16:09:32)                   Impression:      Negative noncontrast head CT.      Electronically signed by: Leandro Zavala  Date:    04/08/2024  Time:    16:09               Narrative:    EXAMINATION:  CT HEAD WITHOUT CONTRAST    CLINICAL HISTORY:  Dizziness, persistent/recurrent, cardiac or vascular cause suspected;    FINDINGS:  No prior studies for comparison.  There is no acute intracranial hemorrhage, with no intra-axial mass, mass effect, or abnormal extra-axial fluid.  Gray-white differentiation is maintained, with the cortical sulci and ventricles normal in size for age.    The cerebellum and brainstem are unremarkable.  There are carotid siphon vascular calcifications.  The visualized paranasal sinuses and mastoid air cells are clear.  There are no acute fractures or destructive osseous lesions.                                       X-Ray Chest 1 View (Final result)  Result time 04/08/24 15:27:17      Final result by Leandro Zavala MD (04/08/24 15:27:17)                   Impression:      No evidence of active cardiopulmonary disease.      Electronically signed by: Leandro Zavala  Date:    04/08/2024  Time:    15:27               Narrative:    EXAMINATION:  XR CHEST 1 VIEW    CLINICAL HISTORY:  Dizziness and giddiness    FINDINGS:  Portable chest radiograph at 15:13 hours with no prior studies for comparison shows the cardiomediastinal silhouette and pulmonary vasculature are within normal limits.  There are aortic vascular  calcifications.    The lungs are normally expanded, with no consolidation, pleural effusion, or evidence of pulmonary edema. No confluent infiltrates or pneumothorax. There are no significant osseous abnormalities.                                       Medications   melatonin tablet 6 mg (has no administration in time range)   ondansetron injection 4 mg (has no administration in time range)   acetaminophen tablet 650 mg (has no administration in time range)   aluminum-magnesium hydroxide-simethicone 200-200-20 mg/5 mL suspension 30 mL (has no administration in time range)   acetaminophen tablet 650 mg (has no administration in time range)   HYDROcodone-acetaminophen 5-325 mg per tablet 1 tablet (has no administration in time range)   naloxone 0.4 mg/mL injection 0.02 mg (has no administration in time range)   potassium bicarbonate disintegrating tablet 50 mEq (has no administration in time range)   potassium bicarbonate disintegrating tablet 35 mEq (has no administration in time range)   potassium bicarbonate disintegrating tablet 60 mEq (has no administration in time range)   magnesium oxide tablet 800 mg (has no administration in time range)   magnesium oxide tablet 800 mg (has no administration in time range)   potassium, sodium phosphates 280-160-250 mg packet 2 packet (has no administration in time range)   potassium, sodium phosphates 280-160-250 mg packet 2 packet (has no administration in time range)   potassium, sodium phosphates 280-160-250 mg packet 2 packet (has no administration in time range)   glucose chewable tablet 16 g (has no administration in time range)   glucose chewable tablet 24 g (has no administration in time range)   dextrose 50% injection 12.5 g (has no administration in time range)   dextrose 50% injection 25 g (has no administration in time range)   glucagon (human recombinant) injection 1 mg (has no administration in time range)   enoxaparin injection 40 mg (has no administration in  time range)   hydrALAZINE injection 10 mg (has no administration in time range)   meclizine tablet 25 mg (has no administration in time range)   aspirin EC tablet 81 mg (has no administration in time range)   atorvastatin tablet 40 mg (has no administration in time range)   buPROPion TB24 tablet 150 mg (has no administration in time range)   citalopram tablet 10 mg (has no administration in time range)   lacosamide tablet 150 mg (has no administration in time range)   losartan tablet 100 mg (has no administration in time range)   multivitamin tablet 1 tablet (has no administration in time range)   phenytoin (DILANTIN) ER capsule 200 mg (has no administration in time range)   tamsulosin 24 hr capsule 0.4 mg (has no administration in time range)   zonisamide capsule 200 mg (has no administration in time range)   iohexoL (OMNIPAQUE 350) injection 100 mL (100 mLs Intravenous Given 4/8/24 1545)   hydrALAZINE injection 10 mg (10 mg Intravenous Given 4/8/24 1705)     Medical Decision Making  This is a 79-year-old male with history of epilepsy and bipolar disorder who comes in complaining of dizziness.  On examination patient's vitals are stable.  He is hypertensive.  He is alert and oriented.  I could not reproduce any focal deficits.    Orders included EKG, CBC, CMP, troponin, BNP, CT angio of the head and neck.  Chest x-ray was ordered.    Comorbidities contributing to patient's presentation include age and elevated blood pressure at present.  Acute exacerbation of chronic illness: None    I reviewed patient's external medical notes.  He was evaluated in urology clinic in December for urinary retention.    Differential diagnosis includes peripheral vertigo, central vertigo, vertebrobasilar insufficiency, TIA, CVA.    Amount and/or Complexity of Data Reviewed  Independent Historian:      Details: Patient's wife is present with him in his independent historian.  External Data Reviewed: labs, radiology and notes.      Details: As detailed above  Labs: ordered.     Details: Labs were reviewed were unremarkable.  UA was still pending at time of admission.  Radiology: ordered and independent interpretation performed.     Details: Chest x-ray was independently reviewed by me and showed no infiltrate or effusion.  CT angio of the head and neck was reviewed with Radiology and was concerning for 90% right ICA stenosis.  ECG/medicine tests: ordered and independent interpretation performed. Decision-making details documented in ED Course.  Discussion of management or test interpretation with external provider(s): Case was discussed with the hospitalist who will admit the patient.    Risk  Prescription drug management.  Risk Details: MDM continued:  Patient's workup is concerning for critical right ICA stenosis which could be contributing to his dizziness.  Patient will be admitted.               ED Course as of 04/08/24 1816 Mon Apr 08, 2024 1658 CT Head Without Contrast [TZ]      ED Course User Index  [TZ] Kristi Benitez MD                           Clinical Impression:  Final diagnoses:  [R42] Dizziness (Primary)  [I65.21] Stenosis of right carotid artery          ED Disposition Condition    Observation                 Kristi Benitez MD  04/08/24 1816

## 2024-04-08 NOTE — HPI
"78 y/o male with pMHx of epilepsy who presented to the ED with c/o intermittent dizziness over the last week. Patient states that he has been having progressively worsening dizziness over the last week. He reports that he went to his Cardiologist's office today and as he was leaving he had a "spell" while trying to get in the car. The office recommended that he come for further evaluation at the ED. On evaluation in the ED a CTA of head and neck performed which revealed  Focal 80-90% stenosis of the right ICA cavernous segment. Otherwise no significant abnormality of the intracranial arterial vasculature. Atheromatous plaque of the carotid bulbs and ICA origins, with no significant carotid or vertebral artery stenosis. A 37 mm right thyroid lobe nodule.  Further characterization with non emergent outpatient thyroid ultrasound is recommended. CT head was negative for acute intracranial abnormalities. Patient admitted for Vascular consult.       "

## 2024-04-08 NOTE — ASSESSMENT & PLAN NOTE
Chronic, uncontrolled. Latest blood pressure and vitals reviewed-     Temp:  [98 °F (36.7 °C)]   Pulse:  [66-86]   Resp:  [16-18]   BP: (165-206)/(70-98)   SpO2:  [97 %-100 %] .   Home meds for hypertension were reviewed and noted below.   Hypertension Medications               captopriL (CAPOTEN) 25 MG tablet Take 1 tablet (25 mg total) by mouth 3 (three) times daily.    captopril-hydrochlorothiazide (CAPOZIDE) 25-15 mg Tab     losartan (COZAAR) 100 MG tablet Take 1 tablet (100 mg total) by mouth once daily.    losartan (COZAAR) 100 MG tablet take 1 tablet Orally Once a day 90 days            While in the hospital, will manage blood pressure as follows; Adjust home antihypertensive regimen as follows- Add IV hydralazine PRN await intervention prior to adjusting home medications    Will utilize p.r.n. blood pressure medication only if patient's blood pressure greater than 180/110 and he develops symptoms such as worsening chest pain or shortness of breath.

## 2024-04-08 NOTE — SUBJECTIVE & OBJECTIVE
Past Medical History:   Diagnosis Date    Epilepsy        Past Surgical History:   Procedure Laterality Date    CYSTOSCOPY N/A 9/27/2023    Procedure: CYSTOSCOPY;  Surgeon: Renny Machado Jr., MD;  Location: [facility] OR;  Service: Urology;  Laterality: N/A;    TRANSRECTAL ULTRASOUND EXAMINATION N/A 9/27/2023    Procedure: ULTRASOUND, RECTAL APPROACH;  Surgeon: Renny Machado Jr., MD;  Location: [facility] OR;  Service: Urology;  Laterality: N/A;       Review of patient's allergies indicates:   Allergen Reactions    Bactrim [sulfamethoxazole-trimethoprim]     Benadryl [diphenhydramine hcl] Other (See Comments)     Lowers seizure threshold.    Quinolones Other (See Comments)     Lowers seizure threshold.      Tadalafil     Tramadol Other (See Comments)     Lowers seizure threshold.         No current facility-administered medications on file prior to encounter.     Current Outpatient Medications on File Prior to Encounter   Medication Sig    aspirin (ECOTRIN) 81 MG EC tablet Take 1 tablet (81 mg total) by mouth once daily.    aspirin 81 mg Cap Take 81 mg by mouth.    atorvastatin (LIPITOR) 40 MG tablet 1 tablet Orally Once a day 30 days    atorvastatin (LIPITOR) 40 MG tablet take 1 tablet Orally Once a day 90 days    buPROPion (WELLBUTRIN XL) 150 MG TB24 tablet Take 150 mg by mouth once daily.    captopriL (CAPOTEN) 25 MG tablet Take 1 tablet (25 mg total) by mouth 3 (three) times daily.    captopril-hydrochlorothiazide (CAPOZIDE) 25-15 mg Tab     citalopram (CELEXA) 20 MG tablet TAKE 1/2 TABLET EVERY DAY    diazePAM (VALIUM) 5 MG tablet Take 1 tablet (5 mg total) by mouth once daily.    lacosamide (VIMPAT) 100 mg Tab Take 1 tablet (100 mg total) by mouth 2 (two) times a day. (Patient taking differently: Take 150 mg by mouth 2 (two) times a day.)    lamoTRIgine (LAMICTAL) 200 MG tablet Take 200 mg by mouth once daily.    losartan (COZAAR) 100 MG tablet Take 1 tablet (100 mg total) by mouth once daily.    losartan  (COZAAR) 100 MG tablet take 1 tablet Orally Once a day 90 days    multivitamin (MULTIPLE VITAMIN ORAL) Take 1 tablet by mouth once daily.    multivitamin with minerals tablet     omeprazole magnesium (PRILOSEC) 10 mg SuDR Prilosec    phenytoin (DILANTIN) 100 MG ER capsule Take 3 capsules (300 mg total) by mouth every evening. (Patient taking differently: Take 200 mg by mouth 2 (two) times daily.)    phenytoin (DILANTIN) 100 MG ER capsule take 3 capsules Orally at night 90 days    phenytoin (DILANTIN) 50 mg chewable tablet Take 1 tablet (50 mg total) by mouth once. for 1 dose    simvastatin (ZOCOR) 5 MG tablet Take 5 mg by mouth every evening.    tamsulosin (FLOMAX) 0.4 mg Cap Take 1 capsule (0.4 mg total) by mouth once daily.    tamsulosin (FLOMAX) 0.4 mg Cap Take 1 capsule (0.4 mg total) by mouth once daily.    zonisamide (ZONEGRAN) 100 MG Cap Take 2 capsules (200 mg total) by mouth nightly at bedtime.    zonisamide (ZONEGRAN) 100 MG Cap take 2 capsules Orally BEDTIME 90 days     Family History       Problem Relation (Age of Onset)    Stroke Mother          Tobacco Use    Smoking status: Never    Smokeless tobacco: Never   Substance and Sexual Activity    Alcohol use: No    Drug use: No    Sexual activity: Not on file     Review of Systems   Constitutional:  Negative for chills, fatigue and fever.   HENT:  Negative for congestion, sneezing and tinnitus.    Eyes:  Negative for visual disturbance.   Respiratory:  Negative for shortness of breath and wheezing.    Cardiovascular:  Negative for chest pain and palpitations.   Gastrointestinal:  Negative for abdominal distention, abdominal pain, nausea and vomiting.   Genitourinary:  Negative for difficulty urinating, frequency and urgency.   Musculoskeletal:  Negative for arthralgias and myalgias.   Skin:  Negative for wound.   Neurological:  Positive for dizziness. Negative for syncope, numbness and headaches.   Psychiatric/Behavioral:  Negative for suicidal ideas. The  patient is not nervous/anxious.      Objective:     Vital Signs (Most Recent):  Temp: 98 °F (36.7 °C) (04/08/24 1203)  Pulse: 86 (04/08/24 1730)  Resp: 17 (04/08/24 1730)  BP: (!) 171/82 (04/08/24 1730)  SpO2: 99 % (04/08/24 1730) Vital Signs (24h Range):  Temp:  [98 °F (36.7 °C)] 98 °F (36.7 °C)  Pulse:  [66-86] 86  Resp:  [16-18] 17  SpO2:  [97 %-100 %] 99 %  BP: (165-206)/(70-98) 171/82     Weight: 74.8 kg (165 lb)  Body mass index is 24.37 kg/m².     Physical Exam  Vitals reviewed.   Constitutional:       Appearance: Normal appearance.   HENT:      Head: Normocephalic and atraumatic.      Nose: No congestion.      Mouth/Throat:      Mouth: Mucous membranes are moist.      Pharynx: Oropharynx is clear.   Eyes:      Extraocular Movements: Extraocular movements intact.      Pupils: Pupils are equal, round, and reactive to light.   Cardiovascular:      Rate and Rhythm: Normal rate and regular rhythm.      Pulses: Normal pulses.      Heart sounds: Murmur heard.   Pulmonary:      Effort: Pulmonary effort is normal.      Breath sounds: Normal breath sounds.   Abdominal:      General: Bowel sounds are normal.      Palpations: Abdomen is soft.   Musculoskeletal:         General: Normal range of motion.      Cervical back: Normal range of motion and neck supple.   Skin:     General: Skin is warm and dry.      Capillary Refill: Capillary refill takes less than 2 seconds.   Neurological:      General: No focal deficit present.      Mental Status: He is alert and oriented to person, place, and time. Mental status is at baseline.   Psychiatric:         Mood and Affect: Mood normal.         Behavior: Behavior normal.         Judgment: Judgment normal.              CRANIAL NERVES     CN III, IV, VI   Pupils are equal, round, and reactive to light.       Significant Labs: All pertinent labs within the past 24 hours have been reviewed.  Recent Lab Results         04/08/24  1517   04/08/24  1258   04/08/24  1257        Albumin    4.5         ALP   133         ALT   33         Anion Gap   10         AST   25         Baso #   0.02         Basophil %   0.5         BILIRUBIN TOTAL   0.4  Comment: For infants and newborns, interpretation of results should be based  on gestational age, weight and in agreement with clinical  observations.    Premature Infant recommended reference ranges:  Up to 24 hours.............<8.0 mg/dL  Up to 48 hours............<12.0 mg/dL  3-5 days..................<15.0 mg/dL  6-29 days.................<15.0 mg/dL           BNP 43  Comment: Values of less than 100 pg/ml are consistent with non-CHF populations.           BUN   12         Calcium   9.2         Chloride   105         CO2   23         Creatinine   1.1         Differential Method   Automated         eGFR   >60.0         Eos #   0.1         Eos %   1.9         Glucose   84         Gran # (ANC)   2.2         Gran %   50.7         Hematocrit   42.6         Hemoglobin   13.9         Immature Grans (Abs)   0.01  Comment: Mild elevation in immature granulocytes is non specific and   can be seen in a variety of conditions including stress response,   acute inflammation, trauma and pregnancy. Correlation with other   laboratory and clinical findings is essential.           Immature Granulocytes   0.2         Lymph #   1.5         Lymph %   34.9         Magnesium  2.0           MCH   30.3         MCHC   32.6         MCV   93         Mono #   0.5         Mono %   11.8         MPV   9.5         nRBC   0         Platelet Count   178         POC Glucose     79       Potassium   3.9         PROTEIN TOTAL   8.2         RBC   4.58         RDW   13.1         Sodium   138         Troponin I High Sensitivity 6.8  Comment: Troponin results differ between methods. Do not use   results between Troponin methods interchangeably.    Alkaline Phospatase levels above 400 U/L may   cause false positive results.    Access hsTnI should not be used for patients taking   Asfotase naida  (Strensiq).             WBC   4.24                 Significant Imaging: I have reviewed all pertinent imaging results/findings within the past 24 hours.

## 2024-04-08 NOTE — H&P
"  [facility] - Emergency Dept  Hospital Medicine  History & Physical    Patient Name: Kallie Ball  MRN: [id number]  Patient Class: OP- Observation  Admission Date: 4/8/2024  Attending Physician: Trina Trinidad MD   Primary Care Provider: Rosy Shah MD         Patient information was obtained from patient, ER records, and 's wife who drove patient .     Subjective:     Principal Problem:Intermittent vertigo    Chief Complaint:   Chief Complaint   Patient presents with    Dizziness     Dizziness sent from cardiologist. Pt states "I get dizzy on and off and I haven't fell yet but I'm gonna."          HPI: 80 y/o male with pMHx of epilepsy who presented to the ED with c/o intermittent dizziness over the last week. Patient states that he has been having progressively worsening dizziness over the last week. He reports that he went to his Cardiologist's office today and as he was leaving he had a "spell" while trying to get in the car. The office recommended that he come for further evaluation at the ED. On evaluation in the ED a CTA of head and neck performed which revealed  Focal 80-90% stenosis of the right ICA cavernous segment. Otherwise no significant abnormality of the intracranial arterial vasculature. Atheromatous plaque of the carotid bulbs and ICA origins, with no significant carotid or vertebral artery stenosis. A 37 mm right thyroid lobe nodule.  Further characterization with non emergent outpatient thyroid ultrasound is recommended. CT head was negative for acute intracranial abnormalities. Patient admitted for Vascular consult.         Past Medical History:   Diagnosis Date    Epilepsy        Past Surgical History:   Procedure Laterality Date    CYSTOSCOPY N/A 9/27/2023    Procedure: CYSTOSCOPY;  Surgeon: Renny Machado Jr., MD;  Location: [facility] OR;  Service: Urology;  Laterality: N/A;    TRANSRECTAL ULTRASOUND EXAMINATION N/A 9/27/2023    Procedure: ULTRASOUND, RECTAL " APPROACH;  Surgeon: Renny Machado Jr., MD;  Location: [facility];  Service: Urology;  Laterality: N/A;       Review of patient's allergies indicates:   Allergen Reactions    Bactrim [sulfamethoxazole-trimethoprim]     Benadryl [diphenhydramine hcl] Other (See Comments)     Lowers seizure threshold.    Quinolones Other (See Comments)     Lowers seizure threshold.      Tadalafil     Tramadol Other (See Comments)     Lowers seizure threshold.         No current facility-administered medications on file prior to encounter.     Current Outpatient Medications on File Prior to Encounter   Medication Sig    aspirin (ECOTRIN) 81 MG EC tablet Take 1 tablet (81 mg total) by mouth once daily.    aspirin 81 mg Cap Take 81 mg by mouth.    atorvastatin (LIPITOR) 40 MG tablet 1 tablet Orally Once a day 30 days    atorvastatin (LIPITOR) 40 MG tablet take 1 tablet Orally Once a day 90 days    buPROPion (WELLBUTRIN XL) 150 MG TB24 tablet Take 150 mg by mouth once daily.    captopriL (CAPOTEN) 25 MG tablet Take 1 tablet (25 mg total) by mouth 3 (three) times daily.    captopril-hydrochlorothiazide (CAPOZIDE) 25-15 mg Tab     citalopram (CELEXA) 20 MG tablet TAKE 1/2 TABLET EVERY DAY    diazePAM (VALIUM) 5 MG tablet Take 1 tablet (5 mg total) by mouth once daily.    lacosamide (VIMPAT) 100 mg Tab Take 1 tablet (100 mg total) by mouth 2 (two) times a day. (Patient taking differently: Take 150 mg by mouth 2 (two) times a day.)    lamoTRIgine (LAMICTAL) 200 MG tablet Take 200 mg by mouth once daily.    losartan (COZAAR) 100 MG tablet Take 1 tablet (100 mg total) by mouth once daily.    losartan (COZAAR) 100 MG tablet take 1 tablet Orally Once a day 90 days    multivitamin (MULTIPLE VITAMIN ORAL) Take 1 tablet by mouth once daily.    multivitamin with minerals tablet     omeprazole magnesium (PRILOSEC) 10 mg SuDR Prilosec    phenytoin (DILANTIN) 100 MG ER capsule Take 3 capsules (300 mg total) by mouth every evening. (Patient taking  differently: Take 200 mg by mouth 2 (two) times daily.)    phenytoin (DILANTIN) 100 MG ER capsule take 3 capsules Orally at night 90 days    phenytoin (DILANTIN) 50 mg chewable tablet Take 1 tablet (50 mg total) by mouth once. for 1 dose    simvastatin (ZOCOR) 5 MG tablet Take 5 mg by mouth every evening.    tamsulosin (FLOMAX) 0.4 mg Cap Take 1 capsule (0.4 mg total) by mouth once daily.    tamsulosin (FLOMAX) 0.4 mg Cap Take 1 capsule (0.4 mg total) by mouth once daily.    zonisamide (ZONEGRAN) 100 MG Cap Take 2 capsules (200 mg total) by mouth nightly at bedtime.    zonisamide (ZONEGRAN) 100 MG Cap take 2 capsules Orally BEDTIME 90 days     Family History       Problem Relation (Age of Onset)    Stroke Mother          Tobacco Use    Smoking status: Never    Smokeless tobacco: Never   Substance and Sexual Activity    Alcohol use: No    Drug use: No    Sexual activity: Not on file     Review of Systems   Constitutional:  Negative for chills, fatigue and fever.   HENT:  Negative for congestion, sneezing and tinnitus.    Eyes:  Negative for visual disturbance.   Respiratory:  Negative for shortness of breath and wheezing.    Cardiovascular:  Negative for chest pain and palpitations.   Gastrointestinal:  Negative for abdominal distention, abdominal pain, nausea and vomiting.   Genitourinary:  Negative for difficulty urinating, frequency and urgency.   Musculoskeletal:  Negative for arthralgias and myalgias.   Skin:  Negative for wound.   Neurological:  Positive for dizziness. Negative for syncope, numbness and headaches.   Psychiatric/Behavioral:  Negative for suicidal ideas. The patient is not nervous/anxious.      Objective:     Vital Signs (Most Recent):  Temp: 98 °F (36.7 °C) (04/08/24 1203)  Pulse: 86 (04/08/24 1730)  Resp: 17 (04/08/24 1730)  BP: (!) 171/82 (04/08/24 1730)  SpO2: 99 % (04/08/24 1730) Vital Signs (24h Range):  Temp:  [98 °F (36.7 °C)] 98 °F (36.7 °C)  Pulse:  [66-86] 86  Resp:  [16-18] 17  SpO2:   [97 %-100 %] 99 %  BP: (165-206)/(70-98) 171/82     Weight: 74.8 kg (165 lb)  Body mass index is 24.37 kg/m².     Physical Exam  Vitals reviewed.   Constitutional:       Appearance: Normal appearance.   HENT:      Head: Normocephalic and atraumatic.      Nose: No congestion.      Mouth/Throat:      Mouth: Mucous membranes are moist.      Pharynx: Oropharynx is clear.   Eyes:      Extraocular Movements: Extraocular movements intact.      Pupils: Pupils are equal, round, and reactive to light.   Cardiovascular:      Rate and Rhythm: Normal rate and regular rhythm.      Pulses: Normal pulses.      Heart sounds: Murmur heard.   Pulmonary:      Effort: Pulmonary effort is normal.      Breath sounds: Normal breath sounds.   Abdominal:      General: Bowel sounds are normal.      Palpations: Abdomen is soft.   Musculoskeletal:         General: Normal range of motion.      Cervical back: Normal range of motion and neck supple.   Skin:     General: Skin is warm and dry.      Capillary Refill: Capillary refill takes less than 2 seconds.   Neurological:      General: No focal deficit present.      Mental Status: He is alert and oriented to person, place, and time. Mental status is at baseline.   Psychiatric:         Mood and Affect: Mood normal.         Behavior: Behavior normal.         Judgment: Judgment normal.              CRANIAL NERVES     CN III, IV, VI   Pupils are equal, round, and reactive to light.       Significant Labs: All pertinent labs within the past 24 hours have been reviewed.  Recent Lab Results         04/08/24  1517   04/08/24  1258   04/08/24  1257        Albumin   4.5         ALP   133         ALT   33         Anion Gap   10         AST   25         Baso #   0.02         Basophil %   0.5         BILIRUBIN TOTAL   0.4  Comment: For infants and newborns, interpretation of results should be based  on gestational age, weight and in agreement with clinical  observations.    Premature Infant recommended  reference ranges:  Up to 24 hours.............<8.0 mg/dL  Up to 48 hours............<12.0 mg/dL  3-5 days..................<15.0 mg/dL  6-29 days.................<15.0 mg/dL           BNP 43  Comment: Values of less than 100 pg/ml are consistent with non-CHF populations.           BUN   12         Calcium   9.2         Chloride   105         CO2   23         Creatinine   1.1         Differential Method   Automated         eGFR   >60.0         Eos #   0.1         Eos %   1.9         Glucose   84         Gran # (ANC)   2.2         Gran %   50.7         Hematocrit   42.6         Hemoglobin   13.9         Immature Grans (Abs)   0.01  Comment: Mild elevation in immature granulocytes is non specific and   can be seen in a variety of conditions including stress response,   acute inflammation, trauma and pregnancy. Correlation with other   laboratory and clinical findings is essential.           Immature Granulocytes   0.2         Lymph #   1.5         Lymph %   34.9         Magnesium  2.0           MCH   30.3         MCHC   32.6         MCV   93         Mono #   0.5         Mono %   11.8         MPV   9.5         nRBC   0         Platelet Count   178         POC Glucose     79       Potassium   3.9         PROTEIN TOTAL   8.2         RBC   4.58         RDW   13.1         Sodium   138         Troponin I High Sensitivity 6.8  Comment: Troponin results differ between methods. Do not use   results between Troponin methods interchangeably.    Alkaline Phospatase levels above 400 U/L may   cause false positive results.    Access hsTnI should not be used for patients taking   Asfotase naida (Strensiq).             WBC   4.24                 Significant Imaging: I have reviewed all pertinent imaging results/findings within the past 24 hours.  Assessment/Plan:     * ICAO (internal carotid artery occlusion), right  Concern for symptomatic stenosis causing Ataxia and intermittent dizziness  CTA revealed 80-90% occlusion of  ICA  Consult Vascular      Intermittent vertigo  Intermittent for the last week  Meclizine PRN  Neurocheck q4H with vs      HLD (hyperlipidemia)  Continue home statin dose      HTN (hypertension)  Chronic, uncontrolled. Latest blood pressure and vitals reviewed-     Temp:  [98 °F (36.7 °C)]   Pulse:  [66-86]   Resp:  [16-18]   BP: (165-206)/(70-98)   SpO2:  [97 %-100 %] .   Home meds for hypertension were reviewed and noted below.   Hypertension Medications               captopriL (CAPOTEN) 25 MG tablet Take 1 tablet (25 mg total) by mouth 3 (three) times daily.    captopril-hydrochlorothiazide (CAPOZIDE) 25-15 mg Tab     losartan (COZAAR) 100 MG tablet Take 1 tablet (100 mg total) by mouth once daily.    losartan (COZAAR) 100 MG tablet take 1 tablet Orally Once a day 90 days            While in the hospital, will manage blood pressure as follows; Adjust home antihypertensive regimen as follows- Add IV hydralazine PRN await intervention prior to adjusting home medications    Will utilize p.r.n. blood pressure medication only if patient's blood pressure greater than 180/110 and he develops symptoms such as worsening chest pain or shortness of breath.    Seizure disorder  Resume home medications  Seizure precautions        VTE Risk Mitigation (From admission, onward)           Ordered     enoxaparin injection 40 mg  Daily         04/08/24 1711     IP VTE HIGH RISK PATIENT  Once         04/08/24 1711     Place sequential compression device  Until discontinued         04/08/24 1711                         On 04/08/2024, patient should be placed in hospital observation services under my care in collaboration with [facility].           Reina Carlin NP  Department of Hospital Medicine  [facility] - Emergency Dept

## 2024-04-09 LAB
ALBUMIN SERPL BCP-MCNC: 4.1 G/DL (ref 3.5–5.2)
ALP SERPL-CCNC: 122 U/L (ref 55–135)
ALT SERPL W/O P-5'-P-CCNC: 25 U/L (ref 10–44)
ANION GAP SERPL CALC-SCNC: 10 MMOL/L (ref 8–16)
AST SERPL-CCNC: 19 U/L (ref 10–40)
BILIRUB SERPL-MCNC: 0.4 MG/DL (ref 0.1–1)
BUN SERPL-MCNC: 13 MG/DL (ref 8–23)
CALCIUM SERPL-MCNC: 8.9 MG/DL (ref 8.7–10.5)
CHLORIDE SERPL-SCNC: 109 MMOL/L (ref 95–110)
CO2 SERPL-SCNC: 21 MMOL/L (ref 23–29)
CREAT SERPL-MCNC: 1 MG/DL (ref 0.5–1.4)
EST. GFR  (NO RACE VARIABLE): >60 ML/MIN/1.73 M^2
GLUCOSE SERPL-MCNC: 89 MG/DL (ref 70–110)
MAGNESIUM SERPL-MCNC: 2 MG/DL (ref 1.6–2.6)
POTASSIUM SERPL-SCNC: 3.8 MMOL/L (ref 3.5–5.1)
PROT SERPL-MCNC: 7.5 G/DL (ref 6–8.4)
SODIUM SERPL-SCNC: 140 MMOL/L (ref 136–145)
TROPONIN I SERPL HS-MCNC: 6.4 PG/ML (ref 0–14.9)

## 2024-04-09 PROCEDURE — 25000003 PHARM REV CODE 250: Performed by: INTERNAL MEDICINE

## 2024-04-09 PROCEDURE — 25000003 PHARM REV CODE 250: Performed by: NURSE PRACTITIONER

## 2024-04-09 PROCEDURE — 80053 COMPREHEN METABOLIC PANEL: CPT | Performed by: NURSE PRACTITIONER

## 2024-04-09 PROCEDURE — 25000003 PHARM REV CODE 250: Performed by: FAMILY MEDICINE

## 2024-04-09 PROCEDURE — 84484 ASSAY OF TROPONIN QUANT: CPT | Performed by: INTERNAL MEDICINE

## 2024-04-09 PROCEDURE — 21400001 HC TELEMETRY ROOM

## 2024-04-09 PROCEDURE — 97165 OT EVAL LOW COMPLEX 30 MIN: CPT

## 2024-04-09 PROCEDURE — 83735 ASSAY OF MAGNESIUM: CPT | Performed by: NURSE PRACTITIONER

## 2024-04-09 PROCEDURE — 63600175 PHARM REV CODE 636 W HCPCS: Performed by: NURSE PRACTITIONER

## 2024-04-09 PROCEDURE — 36415 COLL VENOUS BLD VENIPUNCTURE: CPT | Performed by: NURSE PRACTITIONER

## 2024-04-09 PROCEDURE — 97161 PT EVAL LOW COMPLEX 20 MIN: CPT

## 2024-04-09 RX ORDER — AMLODIPINE BESYLATE 5 MG/1
5 TABLET ORAL DAILY
Status: COMPLETED | OUTPATIENT
Start: 2024-04-09 | End: 2024-04-09

## 2024-04-09 RX ORDER — LOSARTAN POTASSIUM 25 MG/1
25 TABLET ORAL DAILY
Status: DISCONTINUED | OUTPATIENT
Start: 2024-04-09 | End: 2024-04-10 | Stop reason: HOSPADM

## 2024-04-09 RX ORDER — EPHEDRINE SULFATE 50 MG/ML
5 INJECTION, SOLUTION INTRAVENOUS ONCE
Status: DISCONTINUED | OUTPATIENT
Start: 2024-04-09 | End: 2024-04-10 | Stop reason: HOSPADM

## 2024-04-09 RX ORDER — AMLODIPINE BESYLATE 5 MG/1
10 TABLET ORAL DAILY
Status: DISCONTINUED | OUTPATIENT
Start: 2024-04-10 | End: 2024-04-10 | Stop reason: HOSPADM

## 2024-04-09 RX ADMIN — AMLODIPINE BESYLATE 5 MG: 5 TABLET ORAL at 11:04

## 2024-04-09 RX ADMIN — MECLIZINE HYDROCHLORIDE 25 MG: 12.5 TABLET ORAL at 11:04

## 2024-04-09 RX ADMIN — ENOXAPARIN SODIUM 40 MG: 40 INJECTION SUBCUTANEOUS at 05:04

## 2024-04-09 RX ADMIN — MECLIZINE HYDROCHLORIDE 25 MG: 12.5 TABLET ORAL at 05:04

## 2024-04-09 RX ADMIN — PHENYTOIN SODIUM 200 MG: 100 CAPSULE ORAL at 09:04

## 2024-04-09 RX ADMIN — BUPROPION HYDROCHLORIDE 150 MG: 150 TABLET, EXTENDED RELEASE ORAL at 09:04

## 2024-04-09 RX ADMIN — ZONISAMIDE 200 MG: 100 CAPSULE ORAL at 09:04

## 2024-04-09 RX ADMIN — MULTIVITAMIN TABLET 1 TABLET: TABLET at 09:04

## 2024-04-09 RX ADMIN — ATORVASTATIN CALCIUM 40 MG: 40 TABLET, FILM COATED ORAL at 09:04

## 2024-04-09 RX ADMIN — LOSARTAN POTASSIUM 25 MG: 25 TABLET, FILM COATED ORAL at 10:04

## 2024-04-09 RX ADMIN — LACOSAMIDE 150 MG: 100 TABLET, FILM COATED ORAL at 09:04

## 2024-04-09 RX ADMIN — CITALOPRAM HYDROBROMIDE 10 MG: 10 TABLET ORAL at 09:04

## 2024-04-09 RX ADMIN — MECLIZINE HYDROCHLORIDE 25 MG: 12.5 TABLET ORAL at 12:04

## 2024-04-09 RX ADMIN — ASPIRIN 81 MG: 81 TABLET, COATED ORAL at 09:04

## 2024-04-09 NOTE — PT/OT/SLP EVAL
Physical Therapy Evaluation and Discharge Note    Patient Name:  Kallie Ball   MRN:  [id number]    Recommendations:     Discharge Recommendations: No Therapy Indicated  Discharge Equipment Recommendations: none   Barriers to discharge:  medical status    Assessment:     Kallie Ball is a 79 y.o. male admitted with a medical diagnosis of ICAO (internal carotid artery occlusion), right. .  At this time, patient is functioning at their prior level of function and does not require further acute PT services.     Recent Surgery: * No surgery found *      Plan:     During this hospitalization, patient does not require further acute PT services.  Please re-consult if situation changes.      Subjective     Chief Complaint: none stated  Patient/Family Comments/goals: go home  Pain/Comfort:  Pain Rating 1: 0/10    Patients cultural, spiritual, Jain conflicts given the current situation: no    Living Environment:  Pt lives alone in a one story home (-) ,   Prior to admission, patients level of function was Independent no AD.  Equipment used at home: none.  DME owned (not currently used): none.  Upon discharge, patient will have assistance from friends/family.    Objective:     Communicated with RN prior to session.  Patient found HOB elevated with telemetry, peripheral IV upon PT entry to room.    General Precautions: Standard, fall    Orthopedic Precautions:N/A   Braces: N/A  Respiratory Status: Room air    Exams:  RLE ROM: WFL  RLE Strength: WFL  LLE ROM: WFL  LLE Strength: WFL    Functional Mobility:  Bed Mobility:     Supine to Sit: independence  Sit to Supine: independence  Transfers:     Sit to Stand:  independence with no AD  Gait: 150 ft with no AD and supervision    AM-PAC 6 CLICK MOBILITY  Total Score:23       Treatment and Education:  Pt educated on POC, discharge recommendation, importance of time OOB, pacing/energy conservation, need for assist with mobility, use of call bell to seek assistance as  needed and fall prevention      AM-PAC 6 CLICK MOBILITY  Total Score:23     Patient left HOB elevated with all lines intact, call button in reach, bed alarm on, and /'s wife present.    GOALS:   Multidisciplinary Problems       Physical Therapy Goals       Not on file                    History:     Past Medical History:   Diagnosis Date    Epilepsy        Past Surgical History:   Procedure Laterality Date    CYSTOSCOPY N/A 9/27/2023    Procedure: CYSTOSCOPY;  Surgeon: Renny Machado Jr., MD;  Location: [facility] OR;  Service: Urology;  Laterality: N/A;    TRANSRECTAL ULTRASOUND EXAMINATION N/A 9/27/2023    Procedure: ULTRASOUND, RECTAL APPROACH;  Surgeon: Renny Machado Jr., MD;  Location[facility];  Service: Urology;  Laterality: N/A;       Time Tracking:     PT Received On: 04/09/24  PT Start Time: 1140     PT Stop Time: 1152  PT Total Time (min): 12 min     Billable Minutes: Evaluation 12      04/09/2024

## 2024-04-09 NOTE — HOSPITAL COURSE
Pt admitted with dizziness resolved but still have ataxia. Neuro consulted. CT head was negative for acute pathology, CT angiogram head and neck showed cavernous right ICA high-grade stenosis.  MRI brain negative for acute intracranial pathology.  CTA shows 80-90% stenosis in the Right ICA. Vascular surgeon consulted. PT/OT consulted. MR venogram was given increase pressure-like sensation and headache when lying down- MRV showed no venous thrombosis. Checked orthostatic vitals and was positive. Consider adjusting anti HTN and may benefit from compression stockings. Continue with home dose of Vimpat, Phenytoin and Zonisamine. Check Phenytoin levels. Total Dilantin levels elevated  Hold dilantin dose for tonight and resume at lower dose( 150mg BID) from tomorrow AM. Repeat Dilantin Free and Total levels in one week and follow with his neurologist    Deemed stable to be d/c.

## 2024-04-09 NOTE — PLAN OF CARE
[facility]  Initial Discharge Assessment       Primary Care Provider: Rosy Shah MD    Admission Diagnosis: Dizziness [R42]    Admission Date: 4/8/2024  Expected Discharge Date: 4/10/2024    Met with pt at bedside to complete discharge assessment, verified PCP, pharmacy and information on facesheet.  No HH, DME or dialysis.  Called daughterCarlotta and she confirmed information provided by pt.   will pickup pt upon discharge.    Transition of Care Barriers: None    Payor: [employer] MANAGED MEDICARE / Plan: HU[employer] MEDICARE HMO / Product Type: Capitation /     Extended Emergency Contact Information  Primary Emergency Contact: LaporteErica   [place]  Mobile Phone: [phone]  Relation: Spouse  Secondary Emergency Contact: Carlotta Ball  Mobile Phone: [phone]  Relation: Daughter  Preferred language: English   needed? No    Discharge Plan A: Home  Discharge Plan B: [facility] - [facility] [address]  [address] [address]  Phone: [phone] Fax: [phone]    [facility], [place] - [address]  [address] [place] [address]  Phone: [phone] Fax: [phone]      Initial Assessment (most recent)       Adult Discharge Assessment - 04/09/24 1541          Discharge Assessment    Assessment Type Discharge Planning Assessment     Confirmed/corrected address, phone number and insurance Yes     Confirmed Demographics Correct on Facesheet     Source of Information patient;family     Does patient/caregiver understand observation status Yes     Communicated VICENTE with patient/caregiver No     People in Home alone     Do you expect to return to your current living situation? Yes     Prior to hospitilization cognitive status: Alert/Oriented     Current cognitive status: Alert/Oriented     Walking or Climbing Stairs Difficulty no     Dressing/Bathing Difficulty  no     Home Accessibility not wheelchair accessible     Home Layout Able to live on 1st floor     Equipment Currently Used at Home none     Readmission within 30 days? No     Patient currently being followed by outpatient case management? No     Do you currently have service(s) that help you manage your care at home? No     Do you take prescription medications? Yes     Do you have prescription coverage? Yes     Do you have any problems affording any of your prescribed medications? No     Is the patient taking medications as prescribed? --   daughter not sure if pt takes meds as prescribe    Who is going to help you get home at discharge?      How do you get to doctors appointments? family or friend will provide     Are you on dialysis? No     Do you take coumadin? No     Discharge Plan A Home     Discharge Plan B Home Health     DME Needed Upon Discharge  none     Discharge Plan discussed with: Adult children     Transition of Care Barriers None

## 2024-04-09 NOTE — NURSING
"At 2305 notified GREY Hyatt MD that patients bp was 188 systolic at time of admit and prn dose of hydralazine was given per MAR, and bp gradually started going down. At the 30 minute checks his bp was 119/65 map 80 next 30 min check it was 92/67 map 73. Midodrine 10mg was order and given per MAR. Bolus of NS 1000cc ordered and given per MAR. At 0010 notified Edmar Orozco that on entrance of patients room the patient was mumbling and talking to himself. Performed full NIH on patient. Had to provide leading questions for patient to answer. Patient was only oriented to reason of stay and full name. Edmar Orozco made aware of neuro assessment findings and all medication that were given during time of admit to the floor. No new order were given. Notified Edmar Orozco @ 0105 AM that patient BP was 144/70 map 101, and then again @ 0204  AM that bp was 156/73 map 105 and that patient was more alert/awake and oriented at this time. Md stated "Ok that's better"  "

## 2024-04-09 NOTE — ASSESSMENT & PLAN NOTE
Concern for symptomatic stenosis causing Ataxia and intermittent dizziness  CTA revealed 80-90% occlusion of ICA  Consult Vascular

## 2024-04-09 NOTE — CARE UPDATE
Spoke with Vascular Surgeon An Stapleton MD he is unable to intervene with the ICA cavernous segment. Will consult Neurology for evaluation of vertigo/dizziness.

## 2024-04-09 NOTE — NURSING
Nurses Note -- 4 Eyes      4/9/2024   1:24 AM      Skin assessed during: Admit      [x] No Altered Skin Integrity Present    [x]Prevention Measures Documented      [] Yes- Altered Skin Integrity Present or Discovered   [] LDA Added if Not in Epic (Describe Wound)   [] New Altered Skin Integrity was Present on Admit and Documented in LDA   [] Wound Image Taken    Wound Care Consulted? No    Attending Nurse:  Lucrecia Lee RN/Staff Member:   [id number]

## 2024-04-09 NOTE — ASSESSMENT & PLAN NOTE
Intermittent for the last week  Meclizine PRN  Neurocheck q4H with vs  Neuro consulted  MRI brain pending

## 2024-04-09 NOTE — PROGRESS NOTES
"[facility]  Progress Note    Patient Name: Kallie Ball  MRN: [id number]  Patient Class: IP- Inpatient   Admission Date: 4/8/2024  Length of Stay: 0 days  Attending Physician: Kelly Ag MD  Primary Care Provider: Rosy Shah MD        Subjective:     Principal Problem:ICAO (internal carotid artery occlusion), right        HPI:  80 y/o male with pMHx of epilepsy who presented to the ED with c/o intermittent dizziness over the last week. Patient states that he has been having progressively worsening dizziness over the last week. He reports that he went to his Cardiologist's office today and as he was leaving he had a "spell" while trying to get in the car. The office recommended that he come for further evaluation at the ED. On evaluation in the ED a CTA of head and neck performed which revealed  Focal 80-90% stenosis of the right ICA cavernous segment. Otherwise no significant abnormality of the intracranial arterial vasculature. Atheromatous plaque of the carotid bulbs and ICA origins, with no significant carotid or vertebral artery stenosis. A 37 mm right thyroid lobe nodule.  Further characterization with non emergent outpatient thyroid ultrasound is recommended. CT head was negative for acute intracranial abnormalities. Patient admitted for Vascular consult.         Overview/Hospital Course:  Pt admitted with dizziness resolved but still have ataxia. Neuro consulted and MRI brain pending. CTA shows 80-90% stenosis in the Right ICA. Vascular surgeon consulted. PT/OT consulted    Interval History: no dizziness but still unsteady gait    Review of Systems   All other systems reviewed and are negative.    Objective:     Vital Signs (Most Recent):  Temp: 97.8 °F (36.6 °C) (04/09/24 1613)  Pulse: 75 (04/09/24 1613)  Resp: 17 (04/09/24 1613)  BP: (!) 110/91 (04/09/24 1613)  SpO2: 98 % (04/09/24 1613) Vital Signs (24h Range):  Temp:  [96.7 °F (35.9 °C)-98.5 °F (36.9 " °C)] 97.8 °F (36.6 °C)  Pulse:  [72-89] 75  Resp:  [13-19] 17  SpO2:  [97 %-100 %] 98 %  BP: (110-206)/(71-98) 110/91     Weight: 74.5 kg (164 lb 3.9 oz)  Body mass index is 24.25 kg/m².    Intake/Output Summary (Last 24 hours) at 4/9/2024 1645  Last data filed at 4/9/2024 1109  Gross per 24 hour   Intake 0 ml   Output 2300 ml   Net -2300 ml         Physical Exam  Vitals and nursing note reviewed.   Constitutional:       General: He is not in acute distress.     Appearance: He is well-developed.   HENT:      Head: Normocephalic and atraumatic.      Nose: Nose normal.   Eyes:      Conjunctiva/sclera: Conjunctivae normal.   Cardiovascular:      Rate and Rhythm: Normal rate and regular rhythm.      Heart sounds: Normal heart sounds. No murmur heard.  Pulmonary:      Effort: Pulmonary effort is normal.      Breath sounds: Normal breath sounds. No wheezing.   Abdominal:      General: Bowel sounds are normal.      Palpations: Abdomen is soft. There is no mass.      Tenderness: There is no abdominal tenderness. There is no guarding or rebound.   Musculoskeletal:         General: Normal range of motion.      Cervical back: Normal range of motion and neck supple.   Skin:     General: Skin is warm and dry.      Findings: No rash.   Neurological:      Mental Status: He is alert and oriented to person, place, and time.   Psychiatric:         Behavior: Behavior normal.             Significant Labs: All pertinent labs within the past 24 hours have been reviewed.  BMP:   Recent Labs   Lab 04/09/24  0546   GLU 89      K 3.8      CO2 21*   BUN 13   CREATININE 1.0   CALCIUM 8.9   MG 2.0     CBC:   Recent Labs   Lab 04/08/24  1258   WBC 4.24   HGB 13.9*   HCT 42.6          Significant Imaging: I have reviewed all pertinent imaging results/findings within the past 24 hours.  I have reviewed and interpreted all pertinent imaging results/findings within the past 24 hours.    Assessment/Plan:      * ICAO (internal  carotid artery occlusion), right  Concern for symptomatic stenosis causing Ataxia and intermittent dizziness  CTA revealed 80-90% occlusion of ICA  Consult Vascular      HLD (hyperlipidemia)  Continue home statin dose      HTN (hypertension)  Chronic, uncontrolled. Latest blood pressure and vitals reviewed-     Temp:  [98 °F (36.7 °C)]   Pulse:  [66-86]   Resp:  [16-18]   BP: (165-206)/(70-98)   SpO2:  [97 %-100 %] .   Home meds for hypertension were reviewed and noted below.   Hypertension Medications               captopriL (CAPOTEN) 25 MG tablet Take 1 tablet (25 mg total) by mouth 3 (three) times daily.    captopril-hydrochlorothiazide (CAPOZIDE) 25-15 mg Tab     losartan (COZAAR) 100 MG tablet Take 1 tablet (100 mg total) by mouth once daily.    losartan (COZAAR) 100 MG tablet take 1 tablet Orally Once a day 90 days            While in the hospital, will manage blood pressure as follows; Adjust home antihypertensive regimen as follows- Add IV hydralazine PRN await intervention prior to adjusting home medications    Will utilize p.r.n. blood pressure medication only if patient's blood pressure greater than 180/110 and he develops symptoms such as worsening chest pain or shortness of breath.    Seizure disorder  Resume home medications  Seizure precautions      Intermittent vertigo  Intermittent for the last week  Meclizine PRN  Neurocheck q4H with vs  Neuro consulted  MRI brain pending        VTE Risk Mitigation (From admission, onward)           Ordered     enoxaparin injection 40 mg  Daily         04/08/24 1711     IP VTE HIGH RISK PATIENT  Once         04/08/24 1711     Place sequential compression device  Until discontinued         04/08/24 1711                    Discharge Planning   VICENTE: 4/10/2024     Code Status: Full Code   Is the patient medically ready for discharge?:     Reason for patient still in hospital (select all that apply): Treatment  Discharge Plan A: Home                  Kelly KOHLER  MD Kimberli  Department of Hospital Medicine   [facility]

## 2024-04-09 NOTE — SUBJECTIVE & OBJECTIVE
Interval History: no dizziness but still unsteady gait    Review of Systems   All other systems reviewed and are negative.    Objective:     Vital Signs (Most Recent):  Temp: 97.8 °F (36.6 °C) (04/09/24 1613)  Pulse: 75 (04/09/24 1613)  Resp: 17 (04/09/24 1613)  BP: (!) 110/91 (04/09/24 1613)  SpO2: 98 % (04/09/24 1613) Vital Signs (24h Range):  Temp:  [96.7 °F (35.9 °C)-98.5 °F (36.9 °C)] 97.8 °F (36.6 °C)  Pulse:  [72-89] 75  Resp:  [13-19] 17  SpO2:  [97 %-100 %] 98 %  BP: (110-206)/(71-98) 110/91     Weight: 74.5 kg (164 lb 3.9 oz)  Body mass index is 24.25 kg/m².    Intake/Output Summary (Last 24 hours) at 4/9/2024 1645  Last data filed at 4/9/2024 1109  Gross per 24 hour   Intake 0 ml   Output 2300 ml   Net -2300 ml         Physical Exam  Vitals and nursing note reviewed.   Constitutional:       General: He is not in acute distress.     Appearance: He is well-developed.   HENT:      Head: Normocephalic and atraumatic.      Nose: Nose normal.   Eyes:      Conjunctiva/sclera: Conjunctivae normal.   Cardiovascular:      Rate and Rhythm: Normal rate and regular rhythm.      Heart sounds: Normal heart sounds. No murmur heard.  Pulmonary:      Effort: Pulmonary effort is normal.      Breath sounds: Normal breath sounds. No wheezing.   Abdominal:      General: Bowel sounds are normal.      Palpations: Abdomen is soft. There is no mass.      Tenderness: There is no abdominal tenderness. There is no guarding or rebound.   Musculoskeletal:         General: Normal range of motion.      Cervical back: Normal range of motion and neck supple.   Skin:     General: Skin is warm and dry.      Findings: No rash.   Neurological:      Mental Status: He is alert and oriented to person, place, and time.   Psychiatric:         Behavior: Behavior normal.             Significant Labs: All pertinent labs within the past 24 hours have been reviewed.  BMP:   Recent Labs   Lab 04/09/24  0546   GLU 89      K 3.8      CO2 21*    BUN 13   CREATININE 1.0   CALCIUM 8.9   MG 2.0     CBC:   Recent Labs   Lab 04/08/24  1258   WBC 4.24   HGB 13.9*   HCT 42.6          Significant Imaging: I have reviewed all pertinent imaging results/findings within the past 24 hours.  I have reviewed and interpreted all pertinent imaging results/findings within the past 24 hours.

## 2024-04-09 NOTE — PT/OT/SLP EVAL
"Occupational Therapy   Evaluation and Discharge Note    Name: Kallie Ball  MRN: [id number]  Admitting Diagnosis: ICAO (internal carotid artery occlusion), right  Recent Surgery: * No surgery found *      Recommendations:     Discharge Recommendations: No Therapy Indicated  Discharge Equipment Recommendations: none  Barriers to discharge:  None    Assessment:     Kallie Ball is a 79 y.o. male with a medical diagnosis of ICAO (internal carotid artery occlusion), right. At this time, patient is functioning at their prior level of function and does not require further acute OT services.     Plan:     During this hospitalization, patient does not require further acute OT services.  Please re-consult if situation changes.    Plan of Care Reviewed with: patient    Subjective     Chief Complaint: head pressure when going from sitting position to supine position  Patient/Family Comments/goals: to find the reason for his "spells"    Occupational Profile:  Living Environment: Lives alone in a one story home   Previous level of function: Independent; does not drive; reports having a friend that provides transportation; chronic grant   Equipment Used at home: none  Assistance upon Discharge: friend    Pain/Comfort:  Pain Rating 1: 0/10  Pain Rating Post-Intervention 1: 0/10    Objective:     Communicated with: nurse prior to session.  Patient found supine with grant catheter upon OT entry to room.    General Precautions: Standard, fall  Orthopedic Precautions: N/A  Braces: N/A  Respiratory Status: Room air     Occupational Performance:    Bed Mobility:    Patient completed Supine to Sit with independence  Patient completed Sit to Supine with independence    Functional Mobility/Transfers:  Patient completed Sit <> Stand Transfer with independence  with  no assistive device   Functional Mobility: independent in hospital room no AD used; no dizziness; no LOB    Activities of Daily Living:  Lower Body Dressing: independence  "     Cognitive/Visual Perceptual:  Cognitive/Psychosocial Skills:     -       Oriented to: Person, Place, Time, and Situation   -       Follows Commands/attention:Follows multistep  commands    Physical Exam:  Upper Extremity Range of Motion:     -       Right Upper Extremity: WNL  -       Left Upper Extremity: WNL  Upper Extremity Strength:    -       Right Upper Extremity: WNL  -       Left Upper Extremity: WNL   Strength:    -       Right Upper Extremity: WNL  -       Left Upper Extremity: WNL  Fine Motor Coordination:    -       Intact  Gross motor coordination:   WFL    AMPAC 6 Click ADL:  AMPAC Total Score: 24    Treatment & Education:  Therapist provided facilitation and instruction of proper body mechanics and fall prevention strategies during tasks listed above.   Instructed patient to sit in bedside chair as tolerated daily to increase OOB/activity tolerance.   Instructed patient to use call light to have nursing staff assist with needs/transfers.     Patient left HOB elevated with all lines intact, call button in reach, and bed alarm on      History:     Past Medical History:   Diagnosis Date    Epilepsy          Past Surgical History:   Procedure Laterality Date    CYSTOSCOPY N/A 9/27/2023    Procedure: CYSTOSCOPY;  Surgeon: Renny Machado Jr., MD;  Location: [facility];  Service: Urology;  Laterality: N/A;    TRANSRECTAL ULTRASOUND EXAMINATION N/A 9/27/2023    Procedure: ULTRASOUND, RECTAL APPROACH;  Surgeon: Renny Machado Jr., MD;  Location: [facility];  Service: Urology;  Laterality: N/A;       Time Tracking:     OT Date of Treatment: 04/09/24  OT Start Time: 1359  OT Stop Time: 1407  OT Total Time (min): 8 min    Billable Minutes:Evaluation 08 4/9/2024

## 2024-04-09 NOTE — SIGNIFICANT EVENT
UPDATE   NOCTURNIST       This patient was admitted by our Swing shift NP last night     The patient had some dizziness and had 90% stenosed ICA         I was notified later in night by the RN who had this patient that her BP was 188 so she gave the PRN hydralazine 10 mg IV     And then in 60 min or less the BP dropped to 92 mmhg       I was  worried at this point about the risk of MI or CVA   If her cerebral perfusion pressure dropped or diastolic filling pressure to coronaries drops too much too fast     Given her age, she will not be able to autoregulate quickly to maintain end organ pressure control   mainly to heart , brain , kidneys       So I ordered NS 1 liter Bolus and 10 mg po Midodrine     The nurse gave them right away     And within an hour the BP did come up to 150 approx     But nurse said patient was a little confused but no gross neuro deficits     I wanted to give it more time of keeping her BP up and see how she does           Later patient was sleeping and BP was down under 120     I was going to try a dose of 5 mg Ephedrine to boost  BP up faster but they said they can't give that on that floor     But later her BP did go up on its own throughout the night     I added Troponin to the am labs         By morning BP still up and RN said patient was back to baseline and doing fine       Her troponin was normal and there was no SUMAN present on the am labs chemistry               PLAN    - DC PRN  IV hydralazine     - DC the PRN IV metoprolol     - DC the scheduled BP meds for the day time         No more IV BP meds for asymptomatic HTN in her       THERE IS NO EVIDENCE THAT SHOWS BENEFIT TO PRN BP MEDS IN A HOSPITAL FOR ASYMPTOMATIC PATIENTS      THERE IS GOOD EVIDENCE IN LAST FEW YEARS SHOWING HARM FROM PO AND IV INTERVENTION IN ASYMPTOMATIC HYPERTENSION IN A HOSPITAL        The Exception is ,  when end organ damage is present                   TOTAL TIME ;  35 MINUTES Total over night

## 2024-04-09 NOTE — CONSULTS
"[facility]  Department of Neurology  Neurology Consultation Note        PATIENT NAME: Kallie Ball  MRN: [id number]  CSN: [id number]      TODAY'S DATE: 04/09/2024  ADMIT DATE: 4/8/2024                            CONSULTING PROVIDER: Alejandro Rojas MD  CONSULT REQUESTED BY: Kelly Ag MD      Reason for consult:  Episodes of gait imbalance and falls       History obtained from chart review and the patient.    HPI per EMR: Kallie Ball is a 79 y.o. male with a history of presented to the ED with c/o intermittent dizziness over the last week. Patient states that he has been having progressively worsening dizziness over the last week. He reports that he went to his Cardiologist's office today and as he was leaving he had a "spell" while trying to get in the car. The office recommended that he come for further evaluation at the ED. On evaluation in the ED a CTA of head and neck performed which revealed  Focal 80-90% stenosis of the right ICA cavernous segment. Otherwise no significant abnormality of the intracranial arterial vasculature. Atheromatous plaque of the carotid bulbs and ICA origins, with no significant carotid or vertebral artery stenosis.     Neurology consult:  Patient was seen and examined by.  He is alert and oriented x3.  He has been having episodes of intermittent gait imbalance/headache for which he presented to the hospital.  He was at the cardiologist's office yesterday when he had a spell while trying to get into the car and was recommended to go to the ER.    Per patient, these spells come on intermittently and can also start when he is lying down and moves his head to either side and suddenly feels like there is a pressure-like sensation into his head and he sits up.  He denies any room spinning sensation, lightheadedness, unilateral weakness or sensory changes.  He states that these episodes make him feel like he is going to fall backwards however he tries to hold " himself from falling.  Denies any falling spells or hitting his head.  He denies any vision trouble, speech trouble, nausea vomiting.    Has a history of seizures and last seizure was more than a year ago. Reports being compliant with his medications( he is not able to name his seizure meds)      PREVIOUS MEDICAL HISTORY:  Past Medical History:   Diagnosis Date    Epilepsy      PREVIOUS SURGICAL HISTORY:  Past Surgical History:   Procedure Laterality Date    CYSTOSCOPY N/A 9/27/2023    Procedure: CYSTOSCOPY;  Surgeon: Renny Machado Jr., MD;  Location: [facility];  Service: Urology;  Laterality: N/A;    TRANSRECTAL ULTRASOUND EXAMINATION N/A 9/27/2023    Procedure: ULTRASOUND, RECTAL APPROACH;  Surgeon: Renny Machado Jr., MD;  Location: [facility];  Service: Urology;  Laterality: N/A;     FAMILY MEDICAL HISTORY:  Family History   Problem Relation Age of Onset    Stroke Mother      SOCIAL HISTORY:  Social History     Tobacco Use    Smoking status: Never    Smokeless tobacco: Never   Substance Use Topics    Alcohol use: No    Drug use: No     ALLERGIES:  Review of patient's allergies indicates:   Allergen Reactions    Bactrim [sulfamethoxazole-trimethoprim]     Benadryl [diphenhydramine hcl] Other (See Comments)     Lowers seizure threshold.    Quinolones Other (See Comments)     Lowers seizure threshold.      Tadalafil     Tramadol Other (See Comments)     Lowers seizure threshold.       HOME MEDICATIONS:  Prior to Admission medications    Medication Sig Start Date End Date Taking? Authorizing Provider   aspirin (ECOTRIN) 81 MG EC tablet Take 1 tablet (81 mg total) by mouth once daily. 8/28/23   Rosy Shah MD   atorvastatin (LIPITOR) 40 MG tablet take 1 tablet Orally Once a day 90 days 4/2/24      buPROPion (WELLBUTRIN XL) 150 MG TB24 tablet Take 150 mg by mouth once daily.    Provider, Historical   captopriL (CAPOTEN) 25 MG tablet Take 1 tablet (25 mg total) by mouth 3 (three) times daily. 8/2/22  10/31/22  Vishal Adler FNP-C   captopril-hydrochlorothiazide (CAPOZIDE) 25-15 mg Tab  7/3/17   Provider, Historical   citalopram (CELEXA) 20 MG tablet TAKE 1/2 TABLET EVERY DAY 10/18/22   Vishal Adler FNP-C   diazePAM (VALIUM) 5 MG tablet Take 1 tablet (5 mg total) by mouth once daily. 10/16/17 10/16/18  Odilia Machado MD   lacosamide (VIMPAT) 100 mg Tab Take 1 tablet (100 mg total) by mouth 2 (two) times a day.  Patient taking differently: Take 150 mg by mouth 2 (two) times a day. 8/28/23      lamoTRIgine (LAMICTAL) 200 MG tablet Take 200 mg by mouth once daily.    Provider, Historical   losartan (COZAAR) 100 MG tablet Take 1 tablet (100 mg total) by mouth once daily. 8/28/23      multivitamin (MULTIPLE VITAMIN ORAL) Take 1 tablet by mouth once daily.    Provider, Historical   omeprazole magnesium (PRILOSEC) 10 mg SuDR Prilosec    Provider, Historical   phenytoin (DILANTIN) 100 MG ER capsule Take 3 capsules (300 mg total) by mouth every evening.  Patient taking differently: Take 200 mg by mouth 2 (two) times daily. 8/28/23      phenytoin (DILANTIN) 50 mg chewable tablet Take 1 tablet (50 mg total) by mouth once. for 1 dose 8/2/22 9/27/23  Vishal Adler FNP-C   simvastatin (ZOCOR) 5 MG tablet Take 5 mg by mouth every evening.    Provider, Historical   tamsulosin (FLOMAX) 0.4 mg Cap Take 1 capsule (0.4 mg total) by mouth once daily. 7/21/23 7/20/24  Renny Machado Jr., MD   zonisamide (ZONEGRAN) 100 MG Cap take 2 capsules Orally BEDTIME 90 days 4/2/24        CURRENT SCHEDULED MEDICATIONS:   aspirin  81 mg Oral Daily    atorvastatin  40 mg Oral QHS    buPROPion  150 mg Oral Daily    citalopram  10 mg Oral Daily    enoxparin  40 mg Subcutaneous Daily    ePHEDrine sulfate  5 mg Intravenous Once    lacosamide  150 mg Oral BID    meclizine  25 mg Oral Q6H    multivitamin  1 tablet Oral Daily    phenytoin  200 mg Oral BID    zonisamide  200 mg Oral QHS     CURRENT INFUSIONS:    CURRENT PRN  "MEDICATIONS:  acetaminophen, acetaminophen, aluminum-magnesium hydroxide-simethicone, dextrose 50%, dextrose 50%, glucagon (human recombinant), glucose, glucose, HYDROcodone-acetaminophen, magnesium oxide, magnesium oxide, melatonin, naloxone, ondansetron, potassium bicarbonate, potassium bicarbonate, potassium bicarbonate, potassium, sodium phosphates, potassium, sodium phosphates, potassium, sodium phosphates    REVIEW OF SYSTEMS:  Please refer to the HPI for all pertinent positive and negative findings. A comprehensive review of all other systems was negative.       PHYSICAL EXAM:  Patient Vitals for the past 24 hrs:   BP Temp Temp src Pulse Resp SpO2 Height Weight   04/09/24 0707 (!) 169/80 98.1 °F (36.7 °C) Oral 75 17 99 % -- --   04/09/24 0341 (!) 159/87 98.1 °F (36.7 °C) Oral 82 18 97 % -- --   04/09/24 0205 (!) 156/73 -- -- 89 -- -- -- --   04/09/24 0038 (!) 145/79 -- -- 80 -- -- -- --   04/08/24 2225 119/71 98.5 °F (36.9 °C) -- 80 18 97 % -- --   04/08/24 2148 (!) 168/88 -- -- 83 -- -- -- --   04/08/24 2141 (!) 189/97 96.7 °F (35.9 °C) -- 80 18 98 % -- --   04/08/24 2028 -- -- -- -- -- -- 5' 9" (1.753 m) 74.5 kg (164 lb 3.9 oz)   04/08/24 1933 (!) 161/77 -- -- 81 13 99 % -- --   04/08/24 1900 (!) 178/79 -- -- 85 16 99 % -- --   04/08/24 1849 -- -- -- 82 19 99 % -- --   04/08/24 1845 (!) 192/91 -- -- 86 -- 99 % -- --   04/08/24 1830 (!) 172/85 -- -- 84 -- 98 % -- --   04/08/24 1803 (!) 160/77 -- -- 81 16 99 % -- --   04/08/24 1758 -- -- -- 84 18 100 % -- --   04/08/24 1738 -- -- -- 86 15 99 % -- --   04/08/24 1730 (!) 171/82 -- -- 86 17 99 % -- --   04/08/24 1708 (!) 188/97 -- -- 79 16 99 % -- --   04/08/24 1705 (!) 206/98 -- -- -- -- -- -- --   04/08/24 1543 (!) 189/86 -- -- 84 -- 100 % -- --   04/08/24 1455 (!) 167/70 -- -- 78 -- 98 % -- --   04/08/24 1454 (!) 175/81 -- -- 73 -- 100 % -- --   04/08/24 1453 (!) 174/80 -- -- 67 -- 100 % -- --   04/08/24 1203 (!) 165/84 98 °F (36.7 °C) Oral 66 18 97 % 5' 9" " (1.753 m) 74.8 kg (165 lb)       GENERAL APPEARANCE: Alert, well-developed, well-nourished male in no acute distress.  HEENT: Normocephalic and atraumatic. PERRL. Oropharynx unremarkable.  PULM: Normal respiratory effort. No accessory muscle use.  CV: RRR.  ABDOMEN: Soft, nontender.  EXTREMITIES: No obvious signs of vascular compromise. Pulses present. No cyanosis, clubbing or edema.  SKIN: Clear; no rashes, lesions or skin breaks in exposed areas.    NEURO:  MENTAL STATUS: Patient awake and oriented to time, place, and person, recent/remote memory normal, attention span/concentration normal, and speech fluent without paraphasic errors.  Affect euthymic.    CRANIAL NERVES:  CN I: Not tested.  CN II: Fundoscopic exam deferred.  CN III, IV, VI: Pupils equal, round and reactive to light.  Extraocular movements full and intact.  CN V: Facial sensation normal.  CN VII: Facial asymmetry absent.  CN VIII: Hearing grossly normal and equal bilaterally.  No skew deviation or pathologic nystagmus.  CN IX, X: Palate elevates symmetrically. Speech/articulation is clear without dysarthria.  CN XI: Shoulder shrug and chin rotation equal with good strength.  CN XII: Tongue protrusion midline.    MOTOR:  Bulk normal. Tone normal and symmetric throughout.  Abnormal movements absent.  Tremor: none present.  Strength  4/5 throughout .    REFLEXES:  DTRs 1+ throughout.  Plantar response equivocal bilaterally.  SENSATION: grossly intact throughout.  COORDINATION: normal finger-to-nose.  STATION: not tested.  GAIT: not tested.        Labs:  Recent Labs   Lab 04/08/24  1258 04/08/24  1517 04/09/24  0546     --  140   K 3.9  --  3.8     --  109   CO2 23  --  21*   BUN 12  --  13   CREATININE 1.1  --  1.0   GLU 84  --  89   CALCIUM 9.2  --  8.9   MG  --  2.0 2.0     Recent Labs   Lab 04/08/24  1258   WBC 4.24   HGB 13.9*   HCT 42.6        Recent Labs   Lab 04/08/24  1258 04/09/24  0546   ALBUMIN 4.5 4.1   PROT 8.2 7.5  "  BILITOT 0.4 0.4   ALKPHOS 133 122   ALT 33 25   AST 25 19     No results found for: "PT", "PTT", "INR"  Lab Results   Component Value Date    TRIG 50 08/03/2022    HDL 64 08/03/2022    CHOLHDL 35.6 08/03/2022     No results found for: "HGBA1C"  No results found for: "PROTEINCSF", "GLUCCSF", "WBCCSF"    Imaging:  I have reviewed and interpreted the pertinent imaging and lab results.      CTA Head and Neck (xpd)  Narrative: EXAMINATION:  CTA HEAD AND NECK (XPD)    CLINICAL HISTORY:  Dizziness, persistent/recurrent, cardiac or vascular cause suspected;    TECHNIQUE:  Thin axial imaging through the head and neck was performed with 100 mL Omnipaque 350 IV contrast, with sagittal and coronal reformatted images and MIP reconstructions performed, and images stored in the patient's permanent electronic medical record.    COMPARISON:  Multiple prior exams.    FINDINGS:  CTA NECK: Mild calcified plaque involves the aortic arch, which is widely patent.  The visualized subclavian arteries are widely patent, with the visualized common carotid arteries widely patent.  Portion of the left common carotid artery is not visualized due to beam hardening artifact from adjacent contrast in the jugular vein.    Calcified and soft plaque involves the carotid bulbs and ICA origins, was no significant stenosis.  The bilateral internal carotid arteries are widely patent through the level of the skull base.  The external carotid arteries and branches are patent.    The vertebral arteries arise normally from the subclavian arteries, and are widely patent, with the right vertebral artery dominant.  There is no arterial dissection or aneurysm.    A 37 mm heterogeneously enhancing nodule arises from the inferior aspect of the right lobe of the thyroid gland, extending into the superior mediastinum.  The visualized upper lungs are clear.    CTA HEAD: The intracranial segments of the distal vertebral arteries and basilar artery are patent.  The " distal cervical and petrous segments of the internal carotid arteries are patent.  There is focal 80-90% stenosis of the right ICA cavernous segment, with luminal irregularity is both carotid siphons.    The bilateral anterior cerebral arteries are diminutive but patent, with the bilateral middle cerebral arteries and posterior cerebral arteries patent and tapering appropriately.  There is no intracranial aneurysm or vascular malformation.  The visualized dural venous sinuses and cortical veins enhance normally.  Impression: 1. Focal 80-90% stenosis of the right ICA cavernous segment.  2. Otherwise no significant abnormality of the intracranial arterial vasculature.  3. Atheromatous plaque of the carotid bulbs and ICA origins, with no significant carotid or vertebral artery stenosis.  4. A 37 mm right thyroid lobe nodule.  Further characterization with non emergent outpatient thyroid ultrasound is recommended.    Electronically signed by: Leandro Zavala  Date:    04/08/2024  Time:    16:24  CT Head Without Contrast  Narrative: EXAMINATION:  CT HEAD WITHOUT CONTRAST    CLINICAL HISTORY:  Dizziness, persistent/recurrent, cardiac or vascular cause suspected;    FINDINGS:  No prior studies for comparison.  There is no acute intracranial hemorrhage, with no intra-axial mass, mass effect, or abnormal extra-axial fluid.  Gray-white differentiation is maintained, with the cortical sulci and ventricles normal in size for age.    The cerebellum and brainstem are unremarkable.  There are carotid siphon vascular calcifications.  The visualized paranasal sinuses and mastoid air cells are clear.  There are no acute fractures or destructive osseous lesions.  Impression: Negative noncontrast head CT.    Electronically signed by: Leandro Zavala  Date:    04/08/2024  Time:    16:09  X-Ray Chest 1 View  Narrative: EXAMINATION:  XR CHEST 1 VIEW    CLINICAL HISTORY:  Dizziness and giddiness    FINDINGS:  Portable chest radiograph at 15:13  hours with no prior studies for comparison shows the cardiomediastinal silhouette and pulmonary vasculature are within normal limits.  There are aortic vascular calcifications.    The lungs are normally expanded, with no consolidation, pleural effusion, or evidence of pulmonary edema. No confluent infiltrates or pneumothorax. There are no significant osseous abnormalities.  Impression: No evidence of active cardiopulmonary disease.    Electronically signed by: Leandro Zavala  Date:    04/08/2024  Time:    15:27         ASSESSMENT & PLAN:        Gait imbalance   Headache  Epilepsy    Plan:   Etiology of patient's episodes of gait imbalance and pressure/headache when lying down is unknown.  CT head was negative for acute pathology, CT angiogram head and neck showed cavernous right ICA high-grade stenosis.  MRI brain ordered to rule out acute intracranial pathology.  Recommend MR venogram given increase pressure-like sensation and headache when lying down  Slowly normalize blood pressure.    Check orthostatic vitals.    PT OT and speech therapy evaluate and treat.    Continue aspirin, Lipitor for stroke prevention.    Continue with home dose of Vimpat, Phenytoin and Zonisamine. Check Phenytoin levels  Will follow            Thank you kindly for including us in the care of this patient. Please do not hesitate to contact us with any questions.        Alejandro Rojas MD  Neurology/vascular Neurology  Date of Service: 04/09/2024  10:07 AM    --------------------------------------------------------------------------------------------------------------------------------------------------------------------------------------------------------------------------------------------------------------  Please note: This note was transcribed using voice recognition software. Because of this technology there are often uinintended grammatical, spelling, and other transcription errors. Please disregard these errors.

## 2024-04-10 VITALS
HEIGHT: 69 IN | TEMPERATURE: 100 F | OXYGEN SATURATION: 97 % | WEIGHT: 169.06 LBS | DIASTOLIC BLOOD PRESSURE: 69 MMHG | HEART RATE: 66 BPM | RESPIRATION RATE: 18 BRPM | BODY MASS INDEX: 25.04 KG/M2 | SYSTOLIC BLOOD PRESSURE: 136 MMHG

## 2024-04-10 LAB
ALBUMIN SERPL BCP-MCNC: 4.1 G/DL (ref 3.5–5.2)
ALP SERPL-CCNC: 123 U/L (ref 55–135)
ALT SERPL W/O P-5'-P-CCNC: 27 U/L (ref 10–44)
ANION GAP SERPL CALC-SCNC: 7 MMOL/L (ref 8–16)
AST SERPL-CCNC: 24 U/L (ref 10–40)
BILIRUB SERPL-MCNC: 0.4 MG/DL (ref 0.1–1)
BUN SERPL-MCNC: 17 MG/DL (ref 8–23)
CALCIUM SERPL-MCNC: 9.2 MG/DL (ref 8.7–10.5)
CHLORIDE SERPL-SCNC: 107 MMOL/L (ref 95–110)
CO2 SERPL-SCNC: 23 MMOL/L (ref 23–29)
CREAT SERPL-MCNC: 1 MG/DL (ref 0.5–1.4)
EST. GFR  (NO RACE VARIABLE): >60 ML/MIN/1.73 M^2
GLUCOSE SERPL-MCNC: 83 MG/DL (ref 70–110)
MAGNESIUM SERPL-MCNC: 2 MG/DL (ref 1.6–2.6)
PHENYTOIN SERPL-MCNC: 27 UG/ML (ref 10–20)
POTASSIUM SERPL-SCNC: 4 MMOL/L (ref 3.5–5.1)
PROT SERPL-MCNC: 7.8 G/DL (ref 6–8.4)
SODIUM SERPL-SCNC: 137 MMOL/L (ref 136–145)

## 2024-04-10 PROCEDURE — 25000003 PHARM REV CODE 250: Performed by: NURSE PRACTITIONER

## 2024-04-10 PROCEDURE — A9585 GADOBUTROL INJECTION: HCPCS | Performed by: FAMILY MEDICINE

## 2024-04-10 PROCEDURE — 25500020 PHARM REV CODE 255: Performed by: FAMILY MEDICINE

## 2024-04-10 PROCEDURE — 36415 COLL VENOUS BLD VENIPUNCTURE: CPT | Performed by: NURSE PRACTITIONER

## 2024-04-10 PROCEDURE — 80185 ASSAY OF PHENYTOIN TOTAL: CPT | Performed by: INTERNAL MEDICINE

## 2024-04-10 PROCEDURE — 83735 ASSAY OF MAGNESIUM: CPT | Performed by: NURSE PRACTITIONER

## 2024-04-10 PROCEDURE — 80053 COMPREHEN METABOLIC PANEL: CPT | Performed by: NURSE PRACTITIONER

## 2024-04-10 PROCEDURE — 25000003 PHARM REV CODE 250: Performed by: FAMILY MEDICINE

## 2024-04-10 PROCEDURE — 25000003 PHARM REV CODE 250: Performed by: INTERNAL MEDICINE

## 2024-04-10 PROCEDURE — 36415 COLL VENOUS BLD VENIPUNCTURE: CPT | Performed by: INTERNAL MEDICINE

## 2024-04-10 RX ORDER — GADOBUTROL 604.72 MG/ML
7.5 INJECTION INTRAVENOUS
Status: COMPLETED | OUTPATIENT
Start: 2024-04-10 | End: 2024-04-10

## 2024-04-10 RX ORDER — PHENYTOIN SODIUM 100 MG/1
300 CAPSULE, EXTENDED RELEASE ORAL NIGHTLY
Qty: 90 CAPSULE | Refills: 11 | Status: SHIPPED | OUTPATIENT
Start: 2024-04-10

## 2024-04-10 RX ORDER — GADOBUTROL 604.72 MG/ML
7.5 INJECTION INTRAVENOUS
Status: DISCONTINUED | OUTPATIENT
Start: 2024-04-10 | End: 2024-04-10 | Stop reason: SDUPTHER

## 2024-04-10 RX ADMIN — GADOBUTROL 7.5 ML: 604.72 INJECTION INTRAVENOUS at 09:04

## 2024-04-10 RX ADMIN — LOSARTAN POTASSIUM 25 MG: 25 TABLET, FILM COATED ORAL at 08:04

## 2024-04-10 RX ADMIN — AMLODIPINE BESYLATE 10 MG: 5 TABLET ORAL at 08:04

## 2024-04-10 RX ADMIN — PHENYTOIN SODIUM 200 MG: 100 CAPSULE ORAL at 08:04

## 2024-04-10 RX ADMIN — MECLIZINE HYDROCHLORIDE 25 MG: 12.5 TABLET ORAL at 11:04

## 2024-04-10 RX ADMIN — MULTIVITAMIN TABLET 1 TABLET: TABLET at 08:04

## 2024-04-10 RX ADMIN — ASPIRIN 81 MG: 81 TABLET, COATED ORAL at 08:04

## 2024-04-10 RX ADMIN — MECLIZINE HYDROCHLORIDE 25 MG: 12.5 TABLET ORAL at 06:04

## 2024-04-10 RX ADMIN — BUPROPION HYDROCHLORIDE 150 MG: 150 TABLET, EXTENDED RELEASE ORAL at 08:04

## 2024-04-10 RX ADMIN — LACOSAMIDE 150 MG: 100 TABLET, FILM COATED ORAL at 08:04

## 2024-04-10 RX ADMIN — CITALOPRAM HYDROBROMIDE 10 MG: 10 TABLET ORAL at 08:04

## 2024-04-10 NOTE — ASSESSMENT & PLAN NOTE
Neuro consulted. CT head was negative for acute pathology, CT angiogram head and neck showed cavernous right ICA high-grade stenosis.  MRI brain negative for acute intracranial pathology.  CTA shows 80-90% stenosis in the Right ICA. Vascular surgeon consulted. PT/OT consulted. MR venogram was given increase pressure-like sensation and headache when lying down- MRV showed no venous thrombosis. Checked orthostatic vitals and was positive. Consider adjusting anti HTN and may benefit from compression stockings. Continue with home dose of Vimpat, Phenytoin and Zonisamine. Check Phenytoin levels. Total Dilantin levels elevated  Hold dilantin dose for tonight and resume at lower dose( 150mg BID) from tomorrow AM. Repeat Dilantin Free and Total levels in one week and follow with his neurologist

## 2024-04-10 NOTE — PROGRESS NOTES
"[facility]  Department of Neurology  Progress Note  Date: 04/10/2024 12:04 PM          Patient Name: Kallie Ball   MRN: [id number]   : 1945    AGE: 79 y.o.    LOS: 1 days Hospital Day: 3  Admit date: 2024 12:00 PM     HPI per EMR: Kallie Ball is a 79 y.o. male with a history of presented to the ED with c/o intermittent dizziness over the last week. Patient states that he has been having progressively worsening dizziness over the last week. He reports that he went to his Cardiologist's office today and as he was leaving he had a "spell" while trying to get in the car. The office recommended that he come for further evaluation at the ED. On evaluation in the ED a CTA of head and neck performed which revealed  Focal 80-90% stenosis of the right ICA cavernous segment. Otherwise no significant abnormality of the intracranial arterial vasculature. Atheromatous plaque of the carotid bulbs and ICA origins, with no significant carotid or vertebral artery stenosis.      Neurology consult:  Patient was seen and examined by.  He is alert and oriented x3.  He has been having episodes of intermittent gait imbalance/headache for which he presented to the hospital.  He was at the cardiologist's office yesterday when he had a spell while trying to get into the car and was recommended to go to the ER.     Per patient, these spells come on intermittently and can also start when he is lying down and moves his head to either side and suddenly feels like there is a pressure-like sensation into his head and he sits up.  He denies any room spinning sensation, lightheadedness, unilateral weakness or sensory changes.  He states that these episodes make him feel like he is going to fall backwards however he tries to hold himself from falling.  Denies any falling spells or hitting his head.  He denies any vision trouble, speech trouble, nausea vomiting.     Has a history of seizures and last seizure was more than a " year ago. Reports being compliant with his medications( he is not able to name his seizure meds)    04/10/2024: No acute events overnight. Patient was seen and examined by me this morning. Neuro exam is non focal. He states that he has not had any episodes however he states he could bring on these episodes by changing his position.  He denies any other new complaints.       Vitals:  Patient Vitals for the past 24 hrs:   BP Temp Temp src Pulse Resp SpO2 Weight   04/10/24 1100 136/69 100.2 °F (37.9 °C) Oral 66 18 97 % --   04/10/24 0700 (!) 169/80 97.6 °F (36.4 °C) Oral 62 18 98 % --   04/10/24 0307 (!) 161/74 98.5 °F (36.9 °C) Oral 60 18 97 % 76.7 kg (169 lb 1.5 oz)   04/09/24 2323 (!) 192/92 97.6 °F (36.4 °C) Oral 61 18 97 % --   04/09/24 2238 (!) 175/85 -- -- -- -- -- --   04/09/24 1910 (!) 186/86 97.6 °F (36.4 °C) Oral 69 18 100 % --   04/09/24 1613 (!) 110/91 97.8 °F (36.6 °C) Oral 75 17 98 % --     PHYSICAL EXAM:     GENERAL APPEARANCE: Well-developed, well-nourished male in no acute distress.  HEENT: Normocephalic and atraumatic. PERRL. Oropharynx unremarkable.  PULM: Comfortable on room air.  CV: RRR.  ABDOMEN: Soft, nontender.  EXTREMITIES: No signs of vascular compromise. Pulses present. No cyanosis, clubbing or edema.  SKIN: Clear; no rashes, lesions or skin breaks in exposed areas.      NEURO:   MENTAL STATUS: Patient awake and oriented to time, place, and person. Affect normal.  CRANIAL NERVES II-XII: Pupils equal, round and reactive to light. Extraocular movements full and intact. No facial asymmetry.  MOTOR: Normal bulk. Tone normal and symmetrical throughout.  No abnormal movements. No tremor.   Strength 4/5 throughout unless specified below.  REFLEXES: DTRs 1+; normal and symmetric throughout.   SENSATION: Sensation grossly intact to fine touch.  COORDINATION: Finger-to-nose normal for age and symmetric.  STATION: Romberg deferred.  GAIT: Deferred.    CURRENT SCHEDULED MEDICATIONS:   amLODIPine  10 mg  "Oral Daily    aspirin  81 mg Oral Daily    atorvastatin  40 mg Oral QHS    buPROPion  150 mg Oral Daily    citalopram  10 mg Oral Daily    enoxparin  40 mg Subcutaneous Daily    ePHEDrine sulfate  5 mg Intravenous Once    lacosamide  150 mg Oral BID    losartan  25 mg Oral Daily    meclizine  25 mg Oral Q6H    multivitamin  1 tablet Oral Daily    phenytoin  200 mg Oral BID    zonisamide  200 mg Oral QHS     CURRENT INFUSIONS:    DATA:  Recent Labs   Lab 04/08/24  1258 04/08/24  1517 04/09/24  0546 04/10/24  0445     --  140 137   K 3.9  --  3.8 4.0     --  109 107   CO2 23  --  21* 23   BUN 12  --  13 17   CREATININE 1.1  --  1.0 1.0   GLU 84  --  89 83   CALCIUM 9.2  --  8.9 9.2   MG  --  2.0 2.0 2.0   AST 25  --  19 24   ALT 33  --  25 27     Recent Labs   Lab 04/08/24  1258   WBC 4.24   HGB 13.9*   HCT 42.6        No results found for: "PROTEINCSF", "GLUCCSF", "WBCCSF", "RBCCSF", "PMNCSF"  No results found for: "HGBA1C"         I have personally reviewed and interpreted the pertinent imaging and lab results.  Imaging Results              CTA Head and Neck (xpd) (Final result)  Result time 04/08/24 16:24:26      Final result by Leandro Zavala MD (04/08/24 16:24:26)                   Impression:      1. Focal 80-90% stenosis of the right ICA cavernous segment.  2. Otherwise no significant abnormality of the intracranial arterial vasculature.  3. Atheromatous plaque of the carotid bulbs and ICA origins, with no significant carotid or vertebral artery stenosis.  4. A 37 mm right thyroid lobe nodule.  Further characterization with non emergent outpatient thyroid ultrasound is recommended.      Electronically signed by: Leandro Zavala  Date:    04/08/2024  Time:    16:24               Narrative:    EXAMINATION:  CTA HEAD AND NECK (XPD)    CLINICAL HISTORY:  Dizziness, persistent/recurrent, cardiac or vascular cause suspected;    TECHNIQUE:  Thin axial imaging through the head and neck was performed " with 100 mL Omnipaque 350 IV contrast, with sagittal and coronal reformatted images and MIP reconstructions performed, and images stored in the patient's permanent electronic medical record.    COMPARISON:  Multiple prior exams.    FINDINGS:  CTA NECK: Mild calcified plaque involves the aortic arch, which is widely patent.  The visualized subclavian arteries are widely patent, with the visualized common carotid arteries widely patent.  Portion of the left common carotid artery is not visualized due to beam hardening artifact from adjacent contrast in the jugular vein.    Calcified and soft plaque involves the carotid bulbs and ICA origins, was no significant stenosis.  The bilateral internal carotid arteries are widely patent through the level of the skull base.  The external carotid arteries and branches are patent.    The vertebral arteries arise normally from the subclavian arteries, and are widely patent, with the right vertebral artery dominant.  There is no arterial dissection or aneurysm.    A 37 mm heterogeneously enhancing nodule arises from the inferior aspect of the right lobe of the thyroid gland, extending into the superior mediastinum.  The visualized upper lungs are clear.    CTA HEAD: The intracranial segments of the distal vertebral arteries and basilar artery are patent.  The distal cervical and petrous segments of the internal carotid arteries are patent.  There is focal 80-90% stenosis of the right ICA cavernous segment, with luminal irregularity is both carotid siphons.    The bilateral anterior cerebral arteries are diminutive but patent, with the bilateral middle cerebral arteries and posterior cerebral arteries patent and tapering appropriately.  There is no intracranial aneurysm or vascular malformation.  The visualized dural venous sinuses and cortical veins enhance normally.                                       CT Head Without Contrast (Final result)  Result time 04/08/24 16:09:32       Final result by Leandro Zavala MD (04/08/24 16:09:32)                   Impression:      Negative noncontrast head CT.      Electronically signed by: Leandro Zavala  Date:    04/08/2024  Time:    16:09               Narrative:    EXAMINATION:  CT HEAD WITHOUT CONTRAST    CLINICAL HISTORY:  Dizziness, persistent/recurrent, cardiac or vascular cause suspected;    FINDINGS:  No prior studies for comparison.  There is no acute intracranial hemorrhage, with no intra-axial mass, mass effect, or abnormal extra-axial fluid.  Gray-white differentiation is maintained, with the cortical sulci and ventricles normal in size for age.    The cerebellum and brainstem are unremarkable.  There are carotid siphon vascular calcifications.  The visualized paranasal sinuses and mastoid air cells are clear.  There are no acute fractures or destructive osseous lesions.                                       X-Ray Chest 1 View (Final result)  Result time 04/08/24 15:27:17      Final result by Leandro Zavala MD (04/08/24 15:27:17)                   Impression:      No evidence of active cardiopulmonary disease.      Electronically signed by: Leandro Zavala  Date:    04/08/2024  Time:    15:27               Narrative:    EXAMINATION:  XR CHEST 1 VIEW    CLINICAL HISTORY:  Dizziness and giddiness    FINDINGS:  Portable chest radiograph at 15:13 hours with no prior studies for comparison shows the cardiomediastinal silhouette and pulmonary vasculature are within normal limits.  There are aortic vascular calcifications.    The lungs are normally expanded, with no consolidation, pleural effusion, or evidence of pulmonary edema. No confluent infiltrates or pneumothorax. There are no significant osseous abnormalities.                                              ASSESSMENT AND PLAN:     Gait imbalance   Headache  Epilepsy  Orthostatic Hypotension       Plan:   Etiology of patient's episodes of gait imbalance and pressure/headache when lying down is  unknown.  CT head was negative for acute pathology, CT angiogram head and neck showed cavernous right ICA high-grade stenosis.  MRI brain negative for acute intracranial pathology.  MR venogram was given increase pressure-like sensation and headache when lying down- MRV showed no venous thrombosis  Slowly normalize blood pressure.    Checked orthostatic vitals and was positive. Consider adjusting anti HTN and may benefit from compression stockings.    PT OT and speech therapy evaluate and treat.    Continue aspirin, Lipitor for stroke prevention.    Continue with home dose of Vimpat, Phenytoin and Zonisamine. Check Phenytoin levels  Will follow as needed.       Alejandro Rojas MD  Neurology/vascular Neurology  Date of Service: 04/10/2024  12:04 PM    Please note: This note was transcribed using voice recognition software. Because of this technology there are often uinintended grammatical, spelling, and other transcription errors. Please disregard these errors.

## 2024-04-10 NOTE — PROGRESS NOTES
Total Dilantin levels elevated  Hold dilantin dose for tonight and resume at lower dose( 150mg BID) from tomorrow AM.  Repeat Dilantin Free and Total levels in one week and follow with his neurologist    Will follow as needed. Call with any questions

## 2024-04-10 NOTE — ASSESSMENT & PLAN NOTE
Concern for symptomatic stenosis causing Ataxia and intermittent dizziness  CTA revealed 80-90% occlusion of ICA  Consulted Vascular

## 2024-04-10 NOTE — ASSESSMENT & PLAN NOTE
Chronic, uncontrolled. Latest blood pressure and vitals reviewed-     Temp:  [97.6 °F (36.4 °C)-100.2 °F (37.9 °C)]   Pulse:  [60-69]   Resp:  [18]   BP: (136-192)/(69-92)   SpO2:  [97 %-100 %] .   Home meds for hypertension were reviewed and noted below.   Hypertension Medications               captopriL (CAPOTEN) 25 MG tablet Take 1 tablet (25 mg total) by mouth 3 (three) times daily.    captopril-hydrochlorothiazide (CAPOZIDE) 25-15 mg Tab     losartan (COZAAR) 100 MG tablet Take 1 tablet (100 mg total) by mouth once daily.    losartan (COZAAR) 100 MG tablet take 1 tablet Orally Once a day 90 days            While in the hospital, will manage blood pressure as follows; Adjust home antihypertensive regimen as follows- Add IV hydralazine PRN await intervention prior to adjusting home medications    Will utilize p.r.n. blood pressure medication only if patient's blood pressure greater than 180/110 and he develops symptoms such as worsening chest pain or shortness of breath.

## 2024-04-10 NOTE — DISCHARGE SUMMARY
"[facility] Medicine  Discharge Summary      Patient Name: Kallie Ball  MRN: [id number]  YAAKOV: [id number]  Patient Class: IP- Inpatient  Admission Date: 4/8/2024  Hospital Length of Stay: 1 days  Discharge Date and Time:  04/10/2024 4:17 PM  Attending Physician: No att. providers found   Discharging Provider: Kelly Ag MD  Primary Care Provider: Rosy Shah MD    Primary Care Team: Networked reference to record PCT     HPI:   80 y/o male with pMHx of epilepsy who presented to the ED with c/o intermittent dizziness over the last week. Patient states that he has been having progressively worsening dizziness over the last week. He reports that he went to his Cardiologist's office today and as he was leaving he had a "spell" while trying to get in the car. The office recommended that he come for further evaluation at the ED. On evaluation in the ED a CTA of head and neck performed which revealed  Focal 80-90% stenosis of the right ICA cavernous segment. Otherwise no significant abnormality of the intracranial arterial vasculature. Atheromatous plaque of the carotid bulbs and ICA origins, with no significant carotid or vertebral artery stenosis. A 37 mm right thyroid lobe nodule.  Further characterization with non emergent outpatient thyroid ultrasound is recommended. CT head was negative for acute intracranial abnormalities. Patient admitted for Vascular consult.         * No surgery found *      Hospital Course:   Pt admitted with dizziness resolved but still have ataxia. Neuro consulted. CT head was negative for acute pathology, CT angiogram head and neck showed cavernous right ICA high-grade stenosis.  MRI brain negative for acute intracranial pathology.  CTA shows 80-90% stenosis in the Right ICA. Vascular surgeon consulted. PT/OT consulted. MR venogram was given increase pressure-like sensation and headache when lying down- MRV showed no venous thrombosis. Checked " orthostatic vitals and was positive. Consider adjusting anti HTN and may benefit from compression stockings. Continue with home dose of Vimpat, Phenytoin and Zonisamine. Check Phenytoin levels. Total Dilantin levels elevated  Hold dilantin dose for tonight and resume at lower dose( 150mg BID) from tomorrow AM. Repeat Dilantin Free and Total levels in one week and follow with his neurologist    Deemed stable to be d/c.      Goals of Care Treatment Preferences:  Code Status: Full Code      Consults:   Consults (From admission, onward)          Status Ordering Provider     Inpatient consult to Neurology  Once        Provider:  Alejandro oRjas MD    Completed MARITZA ERAZO     Inpatient consult to Vascular Surgery  Once        Provider:  An Stapleton MD    Acknowledged MARITZA ERAZO A            Neuro  Seizure disorder  Resume home medications  Seizure precautions      Cardiac/Vascular  * ICAO (internal carotid artery occlusion), right  Concern for symptomatic stenosis causing Ataxia and intermittent dizziness  CTA revealed 80-90% occlusion of ICA  Consulted Vascular      HLD (hyperlipidemia)  Continue home statin dose      HTN (hypertension)  Chronic, uncontrolled. Latest blood pressure and vitals reviewed-     Temp:  [97.6 °F (36.4 °C)-100.2 °F (37.9 °C)]   Pulse:  [60-69]   Resp:  [18]   BP: (136-192)/(69-92)   SpO2:  [97 %-100 %] .   Home meds for hypertension were reviewed and noted below.   Hypertension Medications               captopriL (CAPOTEN) 25 MG tablet Take 1 tablet (25 mg total) by mouth 3 (three) times daily.    captopril-hydrochlorothiazide (CAPOZIDE) 25-15 mg Tab     losartan (COZAAR) 100 MG tablet Take 1 tablet (100 mg total) by mouth once daily.    losartan (COZAAR) 100 MG tablet take 1 tablet Orally Once a day 90 days            While in the hospital, will manage blood pressure as follows; Adjust home antihypertensive regimen as follows- Add IV hydralazine PRN await intervention prior  to adjusting home medications    Will utilize p.r.n. blood pressure medication only if patient's blood pressure greater than 180/110 and he develops symptoms such as worsening chest pain or shortness of breath.    Other  Intermittent vertigo   Neuro consulted. CT head was negative for acute pathology, CT angiogram head and neck showed cavernous right ICA high-grade stenosis.  MRI brain negative for acute intracranial pathology.  CTA shows 80-90% stenosis in the Right ICA. Vascular surgeon consulted. PT/OT consulted. MR venogram was given increase pressure-like sensation and headache when lying down- MRV showed no venous thrombosis. Checked orthostatic vitals and was positive. Consider adjusting anti HTN and may benefit from compression stockings. Continue with home dose of Vimpat, Phenytoin and Zonisamine. Check Phenytoin levels. Total Dilantin levels elevated  Hold dilantin dose for tonight and resume at lower dose( 150mg BID) from tomorrow AM. Repeat Dilantin Free and Total levels in one week and follow with his neurologist        Final Active Diagnoses:    Diagnosis Date Noted POA    PRINCIPAL PROBLEM:  ICAO (internal carotid artery occlusion), right [I65.21] 04/08/2024 Yes    Intermittent vertigo [R42] 04/08/2024 Yes    Seizure disorder [G40.909] 04/08/2024 Yes    HTN (hypertension) [I10] 04/08/2024 Yes    HLD (hyperlipidemia) [E78.5] 04/08/2024 Yes      Problems Resolved During this Admission:       Discharged Condition: stable    Disposition: Home or Self Care    Follow Up:   Follow-up Information       Rosy Shah MD. Call in 2 day(s).    Specialty: Internal Medicine  Why: Office staff will call you to schedule a hospital follow up appointment within 2 weeks of discharge.  If you do not hear from them by Friday, 4/12, please give the office a call.  Contact information:  [address] [place] [address]  [address] [id number] [phone]                           Patient Instructions:      Diet Adult Regular  "    Notify your health care provider if you experience any of the following:  temperature >100.4     Notify your health care provider if you experience any of the following:  persistent nausea and vomiting or diarrhea     Notify your health care provider if you experience any of the following:  severe uncontrolled pain     Activity as tolerated       Significant Diagnostic Studies: Labs: BMP:   Recent Labs   Lab 04/09/24  0546 04/10/24  0445   GLU 89 83    137   K 3.8 4.0    107   CO2 21* 23   BUN 13 17   CREATININE 1.0 1.0   CALCIUM 8.9 9.2   MG 2.0 2.0    and CBC No results for input(s): "WBC", "HGB", "HCT", "PLT" in the last 48 hours.    Pending Diagnostic Studies:       None           Medications:  Reconciled Home Medications:      Medication List        CHANGE how you take these medications      phenytoin 100 MG ER capsule  Commonly known as: DILANTIN  Take 3 capsules (300 mg total) by mouth every evening.  What changed:   how much to take  when to take this            CONTINUE taking these medications      aspirin 81 MG EC tablet  Commonly known as: ECOTRIN  Take 1 tablet (81 mg total) by mouth once daily.     atorvastatin 40 MG tablet  Commonly known as: LIPITOR  take 1 tablet Orally Once a day 90 days     buPROPion 150 MG TB24 tablet  Commonly known as: WELLBUTRIN XL  Take 150 mg by mouth once daily.     captopriL 25 MG tablet  Commonly known as: CAPOTEN  Take 1 tablet (25 mg total) by mouth 3 (three) times daily.     captopril-hydrochlorothiazide 25-15 mg Tab  Commonly known as: CAPOZIDE     citalopram 20 MG tablet  Commonly known as: CeleXA  TAKE 1/2 TABLET EVERY DAY     diazePAM 5 MG tablet  Commonly known as: VALIUM  Take 1 tablet (5 mg total) by mouth once daily.     lacosamide 100 mg Tab  Commonly known as: VIMPAT  Take 1 tablet (100 mg total) by mouth 2 (two) times a day.     lamoTRIgine 200 MG tablet  Commonly known as: LAMICTAL  Take 200 mg by mouth once daily.     losartan 100 MG " tablet  Commonly known as: COZAAR  Take 1 tablet (100 mg total) by mouth once daily.     MULTIPLE VITAMIN ORAL  Take 1 tablet by mouth once daily.     PriLOSEC 10 mg Sudr  Generic drug: omeprazole magnesium  Prilosec     simvastatin 5 MG tablet  Commonly known as: ZOCOR  Take 5 mg by mouth every evening.     tamsulosin 0.4 mg Cap  Commonly known as: FLOMAX  Take 1 capsule (0.4 mg total) by mouth once daily.     zonisamide 100 MG Cap  Commonly known as: ZONEGRAN  take 2 capsules Orally BEDTIME 90 days            STOP taking these medications      phenytoin 50 mg chewable tablet  Commonly known as: DILANTIN              Indwelling Lines/Drains at time of discharge:   Lines/Drains/Airways       None                   Time spent on the discharge of patient: >30 minutes         Kelly Ag MD  Department of Hospital Medicine  [facility]

## 2024-04-10 NOTE — NURSING
1535- Discharge instructions reviewed with pt and family member. Pt and family member voices understanding of discharge instructions. All questions answered. IV removed and tele monitor off. Pt discharged.

## 2024-04-10 NOTE — PLAN OF CARE
DC orders and chart reviewed. No discharge needs noted.  Patient cleared for discharge from .    Patient is discharging to home.   attempted to schedule hospital follow up appointment.  Message was sent to office staff who will call the patient to schedule.  Information added to AVS.        04/10/24 142   Final Note   Assessment Type Final Discharge Note   Anticipated Discharge Disposition Home   What phone number can be called within the next 1-3 days to see how you are doing after discharge? [phone]   Hospital Resources/Appts/Education Provided Appointment suggestion unavailable;Provided patient/caregiver with written discharge plan information   Post-Acute Status   Discharge Delays None known at this time

## 2024-04-15 ENCOUNTER — PATIENT OUTREACH (OUTPATIENT)
Dept: ADMINISTRATIVE | Facility: CLINIC | Age: 79
End: 2024-04-15
Payer: MEDICARE

## 2024-04-15 NOTE — PROGRESS NOTES
C3 nurse spoke with [place] Ball for a TCC post hospital discharge follow up call. Pt denies any new symptoms and requests a callback with his  to review his meds. He is unaware of his ministers name. C3 nurse also left the TCC number with the pt to please have his  call. Pt confirms he has the OOC number to call with any new or worsening symptoms.     The patient does not have a scheduled HOSFU appointment with his PCP, Rosy Shah MD within 5-7 days post hospital discharge date of 4/10/24. C3 nurse unable to schedule with PCP or [facility]. Unable to route any messages to Rosy Shah MD.

## 2024-04-16 ENCOUNTER — PATIENT MESSAGE (OUTPATIENT)
Dept: ADMINISTRATIVE | Facility: CLINIC | Age: 79
End: 2024-04-16
Payer: MEDICARE

## 2024-04-16 NOTE — PROGRESS NOTES
C3 nurse spoke with Kallie Ball for a TCC post hospital discharge follow up call. Pt denies any new symptoms and requests a callback with his  to review his meds. He is unaware of his ministers name. C3 nurse also left the TCC number with the pt to please have his  call. No answer or voicemail available on the 's phone. Pt confirms he has the OOC number to call with any new or worsening symptoms.      The patient does not have a scheduled HOSFU appointment with his PCP, Rosy Shah MD within 5-7 days post hospital discharge date of 4/10/24. C3 nurse unable to schedule with PCP or [facility]PH[facility]. Unable to route any messages to Rosy Shah MD.

## 2024-04-16 NOTE — PLAN OF CARE
Through communication with [facility], the Inpatient order is being changed to observation as the payer will not authorize Inpatient and the facility agrees not to appeal or challenge the change in status. The account will be changed to Observation for billing purposes.       Rosy Noe MD  Physician Advisor

## 2024-04-23 ENCOUNTER — TELEPHONE (OUTPATIENT)
Dept: UROLOGY | Facility: CLINIC | Age: 79
End: 2024-04-23
Payer: MEDICARE

## 2024-04-23 DIAGNOSIS — E04.1 RIGHT THYROID NODULE: Primary | ICD-10-CM

## 2024-04-23 NOTE — TELEPHONE ENCOUNTER
Spoke with patient and he states he is unable to make it to his catheter change appointment on thursday. Rescheduled appointment to tomorrow for his monthly cath change

## 2024-04-23 NOTE — TELEPHONE ENCOUNTER
----- Message from Eric Sevilla sent at 4/23/2024  2:02 PM CDT -----  Regarding: wants to talk to the office  Contact: kateryna wife  Type:  Needs Medical Advice    Who Called: Pts friend        Would the patient rather a call back or a response via [facility]? Call back    Best Call Back Number: [phone]    Additional Information: Sts she needs to talk to someone in the office as soon as possible about the pt and only wants to  talk to someone in the office.   Please advise -- Thank you

## 2024-04-24 ENCOUNTER — CLINICAL SUPPORT (OUTPATIENT)
Dept: UROLOGY | Facility: CLINIC | Age: 79
End: 2024-04-24
Payer: MEDICARE

## 2024-04-24 DIAGNOSIS — R33.9 URINARY RETENTION: Primary | ICD-10-CM

## 2024-04-24 PROCEDURE — 99499 UNLISTED E&M SERVICE: CPT | Mod: S$GLB,,, | Performed by: UROLOGY

## 2024-04-24 PROCEDURE — 51702 INSERT TEMP BLADDER CATH: CPT | Mod: S$GLB,,, | Performed by: UROLOGY

## 2024-04-24 NOTE — PROGRESS NOTES
Patient here today for catheter change.   2 patient identifiers verified  10 mL sterile water removed from catheter balloon.   100 ml of sterile water placed in bladder  Catheter removed.   Catheter bag contains 10ml of yellow urine  ?  Patient draped and prepped in sterile fashion.?  16 FR Coude catheter placed into the bladder with no difficulty.   Balloon filled with 10 ml sterile water  Catheter attached to leg bag.   Leg bag secured to right with stat-Lock.  ?  Patient left the office in satisfactory condition

## 2024-04-29 LAB
OHS QRS DURATION: 88 MS
OHS QTC CALCULATION: 540 MS

## 2024-05-24 ENCOUNTER — CLINICAL SUPPORT (OUTPATIENT)
Dept: UROLOGY | Facility: CLINIC | Age: 79
End: 2024-05-24
Payer: MEDICARE

## 2024-05-24 DIAGNOSIS — Z97.8 INDWELLING FOLEY CATHETER PRESENT: Primary | ICD-10-CM

## 2024-05-24 PROCEDURE — 99499 UNLISTED E&M SERVICE: CPT | Mod: S$GLB,,, | Performed by: UROLOGY

## 2024-05-24 PROCEDURE — 51702 INSERT TEMP BLADDER CATH: CPT | Mod: S$GLB,,, | Performed by: UROLOGY

## 2024-05-24 NOTE — PROGRESS NOTES
Patient arrived to clinic to have catheter changed, two patient identifier done, to exam room and table. Deflated 10 ml saline balloon and removed 16 fr grant catheter. Patient draped and prepped, inserted 16 fr coude catheter with clear yellow urine return to urinal, patient tolerated well. Inflated 10 ml saline balloon, leg bag and leg strap attached.

## 2024-06-10 ENCOUNTER — LAB VISIT (OUTPATIENT)
Dept: LAB | Facility: HOSPITAL | Age: 79
End: 2024-06-10
Attending: NURSE PRACTITIONER
Payer: MEDICARE

## 2024-06-10 DIAGNOSIS — R56.9 GENERALIZED-ONSET SEIZURES: Primary | ICD-10-CM

## 2024-06-10 LAB
ALBUMIN SERPL BCP-MCNC: 4.4 G/DL (ref 3.5–5.2)
ALP SERPL-CCNC: 139 U/L (ref 55–135)
ALT SERPL W/O P-5'-P-CCNC: 22 U/L (ref 10–44)
ANION GAP SERPL CALC-SCNC: 7 MMOL/L (ref 8–16)
AST SERPL-CCNC: 17 U/L (ref 10–40)
BILIRUB SERPL-MCNC: 0.3 MG/DL (ref 0.1–1)
BUN SERPL-MCNC: 9 MG/DL (ref 8–23)
CALCIUM SERPL-MCNC: 9.4 MG/DL (ref 8.7–10.5)
CHLORIDE SERPL-SCNC: 106 MMOL/L (ref 95–110)
CO2 SERPL-SCNC: 25 MMOL/L (ref 23–29)
CREAT SERPL-MCNC: 1 MG/DL (ref 0.5–1.4)
ERYTHROCYTE [DISTWIDTH] IN BLOOD BY AUTOMATED COUNT: 13.1 % (ref 11.5–14.5)
EST. GFR  (NO RACE VARIABLE): >60 ML/MIN/1.73 M^2
GLUCOSE SERPL-MCNC: 85 MG/DL (ref 70–110)
HCT VFR BLD AUTO: 39.6 % (ref 40–54)
HGB BLD-MCNC: 12.9 G/DL (ref 14–18)
MCH RBC QN AUTO: 30 PG (ref 27–31)
MCHC RBC AUTO-ENTMCNC: 32.6 G/DL (ref 32–36)
MCV RBC AUTO: 92 FL (ref 82–98)
PHENYTOIN SERPL-MCNC: 14.5 UG/ML (ref 10–20)
PLATELET # BLD AUTO: 168 K/UL (ref 150–450)
PMV BLD AUTO: 9.7 FL (ref 9.2–12.9)
POTASSIUM SERPL-SCNC: 4.2 MMOL/L (ref 3.5–5.1)
PROT SERPL-MCNC: 8.3 G/DL (ref 6–8.4)
RBC # BLD AUTO: 4.3 M/UL (ref 4.6–6.2)
SODIUM SERPL-SCNC: 138 MMOL/L (ref 136–145)
WBC # BLD AUTO: 5.06 K/UL (ref 3.9–12.7)

## 2024-06-10 PROCEDURE — 80185 ASSAY OF PHENYTOIN TOTAL: CPT | Performed by: NURSE PRACTITIONER

## 2024-06-10 PROCEDURE — 80235 DRUG ASSAY LACOSAMIDE: CPT | Performed by: NURSE PRACTITIONER

## 2024-06-10 PROCEDURE — 85027 COMPLETE CBC AUTOMATED: CPT | Performed by: NURSE PRACTITIONER

## 2024-06-10 PROCEDURE — 36415 COLL VENOUS BLD VENIPUNCTURE: CPT | Performed by: NURSE PRACTITIONER

## 2024-06-10 PROCEDURE — 80053 COMPREHEN METABOLIC PANEL: CPT | Performed by: NURSE PRACTITIONER

## 2024-06-13 LAB
LABCORP MISC TEST CODE: 7915
LABCORP MISC TEST NAME: NORMAL
LABCORP MISCELLANEOUS TEST: NORMAL

## 2024-06-18 LAB — LACOSAMIDE SERPL-MCNC: 9.3 UG/ML (ref 5–10)

## 2024-06-24 ENCOUNTER — TELEPHONE (OUTPATIENT)
Dept: UROLOGY | Facility: CLINIC | Age: 79
End: 2024-06-24
Payer: MEDICARE

## 2024-06-24 NOTE — TELEPHONE ENCOUNTER
----- Message from Jackie Walters sent at 6/24/2024  2:00 PM CDT -----  Contact: Sister  Type:  Appointment Request    Caller is requesting a sooner appointment.  Caller declined first available appointment listed below.  Caller will not accept being placed on the waitlist and is requesting a message be sent to doctor.    Name of Caller:  Sister  When is the first available appointment?  N/A    Would the patient rather a call back or a response via [facility]?   Call back  Best Call Back Number:  [phone]    Additional Information:   States they would like to reschedule his nurse visit tomorrow due to no transportation - please call - thank you

## 2024-06-28 ENCOUNTER — CLINICAL SUPPORT (OUTPATIENT)
Dept: UROLOGY | Facility: CLINIC | Age: 79
End: 2024-06-28
Payer: MEDICARE

## 2024-06-28 DIAGNOSIS — R33.9 URINARY RETENTION: Primary | ICD-10-CM

## 2024-07-26 ENCOUNTER — TELEPHONE (OUTPATIENT)
Dept: UROLOGY | Facility: CLINIC | Age: 79
End: 2024-07-26
Payer: MEDICARE

## 2024-07-31 ENCOUNTER — CLINICAL SUPPORT (OUTPATIENT)
Dept: UROLOGY | Facility: CLINIC | Age: 79
End: 2024-07-31
Payer: MEDICARE

## 2024-07-31 DIAGNOSIS — R33.9 URINARY RETENTION: Primary | ICD-10-CM

## 2024-07-31 PROCEDURE — 99499 UNLISTED E&M SERVICE: CPT | Mod: S$GLB,,, | Performed by: UROLOGY

## 2024-07-31 PROCEDURE — 51702 INSERT TEMP BLADDER CATH: CPT | Mod: S$GLB,,, | Performed by: UROLOGY

## 2024-08-14 ENCOUNTER — TELEPHONE (OUTPATIENT)
Dept: NEUROLOGY | Facility: CLINIC | Age: 79
End: 2024-08-14
Payer: MEDICARE

## 2024-08-14 NOTE — TELEPHONE ENCOUNTER
----- Message from Ava Soliman RN sent at 8/14/2024  3:59 PM CDT -----  Please book with Sanjana    roberta  ----- Message -----  From: Theresa Michael  Sent: 8/14/2024   3:09 PM CDT  To: Ava Soliman RN    Hello,    Patient has a referral for stenosis of right internal carotid artery to Neuro Interventional Radiology scanned in media mgr. Can you please advise if Neurology department handle this.    Thanks

## 2024-09-09 ENCOUNTER — CLINICAL SUPPORT (OUTPATIENT)
Dept: UROLOGY | Facility: CLINIC | Age: 79
End: 2024-09-09
Payer: MEDICARE

## 2024-09-09 DIAGNOSIS — R33.9 URINARY RETENTION: Primary | ICD-10-CM

## 2024-09-09 PROCEDURE — 99499 UNLISTED E&M SERVICE: CPT | Mod: S$GLB,,, | Performed by: UROLOGY

## 2024-09-09 PROCEDURE — 51702 INSERT TEMP BLADDER CATH: CPT | Mod: S$GLB,,, | Performed by: UROLOGY

## 2024-11-13 ENCOUNTER — TELEPHONE (OUTPATIENT)
Dept: UROLOGY | Facility: CLINIC | Age: 79
End: 2024-11-13
Payer: MEDICARE

## 2024-11-13 NOTE — TELEPHONE ENCOUNTER
Spoke with pt and his wife. Informed them that our office was not trying to contact them. Pt wife stated that the pt recently had labs done at [facility]. Informed pt and his wife that they are the ones that trying to call them. Pt and his wife verbalized understanding.

## 2024-11-13 NOTE — TELEPHONE ENCOUNTER
----- Message from Jackie sent at 11/13/2024 12:56 PM CST -----  Contact: Patient  Type:  Patient Returning Call    Who Called:  Patient  Who Left Message for Patient:  Carla  Does the patient know what this is regarding?:  Test results    Would the patient rather a call back or a response via [facility]?   Call back  Best Call Back Number:   [phone]    Additional Information:   States he is returning a missed call - please call back - thank you

## 2024-12-17 ENCOUNTER — CLINICAL SUPPORT (OUTPATIENT)
Dept: UROLOGY | Facility: CLINIC | Age: 79
End: 2024-12-17
Payer: MEDICARE

## 2024-12-17 DIAGNOSIS — R33.9 URINARY RETENTION: Primary | ICD-10-CM

## 2024-12-17 PROCEDURE — 51702 INSERT TEMP BLADDER CATH: CPT | Mod: S$GLB,,, | Performed by: UROLOGY

## 2024-12-17 PROCEDURE — 99499 UNLISTED E&M SERVICE: CPT | Mod: S$GLB,,, | Performed by: UROLOGY

## 2024-12-18 NOTE — PROGRESS NOTES
Pt arrived to clinic to have catheter change. Deflated 10mL saline balloon, removed 16 fr catheter without difficulty. Pt draped and prepped inserted 16 fr coude catheter without difficulty. Stat lock and leg bag attached. Patient tolerated well.

## 2024-12-19 ENCOUNTER — TELEPHONE (OUTPATIENT)
Dept: UROLOGY | Facility: CLINIC | Age: 79
End: 2024-12-19
Payer: MEDICARE

## 2024-12-19 NOTE — TELEPHONE ENCOUNTER
----- Message from Khushbu sent at 12/19/2024  4:52 PM CST -----  Type:  Patient Returning Call    Who Called:  pt wife  Who Left Message for Patient:    Does the patient know what this is regarding?:  labs  Best Call Back Number:  [phone]  Additional Information:  left message late in day  Please call back to advise. Thanks!   Ketoconazole Pregnancy And Lactation Text: This medication is Pregnancy Category C and it isn't know if it is safe during pregnancy. It is also excreted in breast milk and breast feeding isn't recommended.

## 2024-12-26 ENCOUNTER — OFFICE VISIT (OUTPATIENT)
Dept: UROLOGY | Facility: CLINIC | Age: 79
End: 2024-12-26
Payer: MEDICARE

## 2024-12-26 DIAGNOSIS — Z97.8 INDWELLING FOLEY CATHETER PRESENT: ICD-10-CM

## 2024-12-26 DIAGNOSIS — R33.9 URINARY RETENTION: Primary | ICD-10-CM
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
## 2024-12-27 ENCOUNTER — TELEPHONE (OUTPATIENT)
Dept: UROLOGY | Facility: CLINIC | Age: 79
End: 2024-12-27
Payer: MEDICARE

## 2024-12-27 NOTE — TELEPHONE ENCOUNTER
----- Message from Renny Machado MD sent at 12/26/2024  2:57 PM CST -----  - Return as nurse visit next week for grant removal. Will need to return in afternoon for PVR.   - RTC in 1 year or sooner if unable to void.   - Thanks.

## 2025-01-03 ENCOUNTER — CLINICAL SUPPORT (OUTPATIENT)
Dept: UROLOGY | Facility: CLINIC | Age: 80
End: 2025-01-03
Payer: MEDICARE

## 2025-01-03 DIAGNOSIS — Z97.8 INDWELLING FOLEY CATHETER PRESENT: Primary | ICD-10-CM

## 2025-01-03 PROCEDURE — 99499 UNLISTED E&M SERVICE: CPT | Mod: S$GLB,,, | Performed by: UROLOGY
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
## (undated) DEVICE — CATH COUDE 18F 5CC W/STAT LOC

## (undated) DEVICE — SET CYSTO IRR DRP CHMBR 84IN

## (undated) DEVICE — GLOVE SURG ULTRA TOUCH 7.5

## (undated) DEVICE — SPONGE GAUZE 16PLY 4X4

## (undated) DEVICE — Device

## (undated) DEVICE — DRAPE MEDIUM SHEET 40X70IN

## (undated) DEVICE — BAG LEG EX-TUBING MED 18 20OZ

## (undated) DEVICE — JELLY KY LUBRICATING 5G PACKET

## (undated) DEVICE — GOWN X-LG STERILE BACK

## (undated) DEVICE — SCRUB 10% POVIDONE IODINE 4OZ